# Patient Record
Sex: MALE | Race: WHITE | Employment: FULL TIME | URBAN - METROPOLITAN AREA
[De-identification: names, ages, dates, MRNs, and addresses within clinical notes are randomized per-mention and may not be internally consistent; named-entity substitution may affect disease eponyms.]

---

## 2017-04-10 NOTE — PROGRESS NOTES
"  SUBJECTIVE:                                                    Dionisio Pisano is a 31 year old male who presents to clinic today for the following health issues:      HPI    Concern - Tennis Elbow Left arm      Onset: a few months    Description:   Pain in left elbow and arm    Intensity: mild, moderate    Progression of Symptoms:  intermittent and waxing and waning    Accompanying Signs & Symptoms:  -sharp pain   -sometimes pain is with lifting or just turning   -pain started mild and now is all the time with times of sharper worse pain  -has been using tennis elbow brace from other arm but isn't getting relief        Previous history of similar problem:   Only in Right arm     Precipitating factors:   Worsened by: lifting     Alleviating factors:  Improved by: ice helps temporarily        Therapies Tried and outcome: ice and brace       Problem list and histories reviewed & adjusted, as indicated.  Additional history: as documented    Lump on left side of neck       Duration: 2 years or so     Description (location/character/radiation): left side of neck     Intensity:  moderate    Accompanying signs and symptoms: same size, was looked at once before and was told it may go away on its own. It hasn't, would like to talk about removing it     History (similar episodes/previous evaluation): None    Precipitating or alleviating factors: None    Therapies tried and outcome: None     ROS:  Constitutional, HEENT, cardiovascular, pulmonary, GI, , musculoskeletal, neuro, skin, endocrine and psych systems are negative, except as otherwise noted.    OBJECTIVE:                                                    /58 (BP Location: Left arm, Patient Position: Chair, Cuff Size: Adult Regular)  Pulse 82  Temp 98.7  F (37.1  C) (Oral)  Resp 18  Ht 5' 6.38\" (1.686 m)  Wt 163 lb (73.9 kg)  BMI 26.01 kg/m2  Body mass index is 26.01 kg/(m^2).  GENERAL: healthy, alert and no distress  EYES: Eyes grossly normal to " inspection, PERRL and conjunctivae and sclerae normal  HENT: ear canals and TM's normal, nose and mouth without ulcers or lesions  NECK: no adenopathy, no asymmetry, masses, or scars and thyroid normal to palpation  RESP: lungs clear to auscultation - no rales, rhonchi or wheezes  CV: regular rate and rhythm, normal S1 S2, no S3 or S4, no murmur, click or rub, no peripheral edema and peripheral pulses strong  ABDOMEN: soft, nontender, no hepatosplenomegaly, no masses and bowel sounds normal  MS: LUE exam shows no deformities and pain radiating form elbow to wrist with motion. Denies numbness or tingling. Neg. Finkelstein.    SKIN: dermoid cyst 1 mm in diameter. Non painful, mobile,   NEURO: Normal strength and tone, weakness of LUE and grasp, sensory exam grossly normal and mentation intact     Diagnostic Test Results:  none      ASSESSMENT/PLAN:                                                      1. Lateral epicondylitis of left elbow  Will start oral steroid for acute inflammation and continue home care as instructed.   - methylPREDNISolone (MEDROL DOSEPAK) 4 MG tablet; Follow package instructions  Dispense: 21 tablet; Refill: 0  - ORTHO  REFERRAL    2. Dermoid cyst    Will set up future appointment for procedure  Discussed risks and benefits of procedure including bleeding, infection, and scarring   - Recommend procedure as follows:           1) removal via incision with 15 blade and drainage with sutures and removal in 10 days                        Supplies: 1% lidocaine with epi, 2 alcohol wipes, chloraprep, biopsy blade - 15, lac pack, Sutures: 3-0 Ethilon, sterile 4x4 pack, 2 - sterile 2x2, 2 - small tegaderm    Patient Instructions   Please continue to ice over weekend 3-4 times daily 20 minutes. Arm band as you have been and start steroid today this may cause insomnia so taking earlier would be best.     Please follow up with orthopedic as discussed.     Thank you  Madeline Craft CNP

## 2017-04-14 ENCOUNTER — OFFICE VISIT (OUTPATIENT)
Dept: FAMILY MEDICINE | Facility: OTHER | Age: 32
End: 2017-04-14
Payer: COMMERCIAL

## 2017-04-14 VITALS
HEART RATE: 82 BPM | HEIGHT: 66 IN | RESPIRATION RATE: 18 BRPM | WEIGHT: 163 LBS | DIASTOLIC BLOOD PRESSURE: 58 MMHG | SYSTOLIC BLOOD PRESSURE: 122 MMHG | TEMPERATURE: 98.7 F | BODY MASS INDEX: 26.2 KG/M2

## 2017-04-14 DIAGNOSIS — M77.12 LATERAL EPICONDYLITIS OF LEFT ELBOW: Primary | ICD-10-CM

## 2017-04-14 DIAGNOSIS — D36.9 DERMOID CYST: ICD-10-CM

## 2017-04-14 PROCEDURE — 99214 OFFICE O/P EST MOD 30 MIN: CPT | Performed by: NURSE PRACTITIONER

## 2017-04-14 RX ORDER — METHYLPREDNISOLONE 4 MG
TABLET, DOSE PACK ORAL
Qty: 21 TABLET | Refills: 0 | Status: SHIPPED | OUTPATIENT
Start: 2017-04-14 | End: 2017-05-01

## 2017-04-14 ASSESSMENT — PAIN SCALES - GENERAL: PAINLEVEL: EXTREME PAIN (8)

## 2017-04-14 NOTE — MR AVS SNAPSHOT
After Visit Summary   4/14/2017    Dionisio Pisano    MRN: [id number]           Patient Information     Date Of Birth          1985        Visit Information        Provider Department      4/14/2017 3:20 PM Madeline Craft APRN CNP [facility]        Today's Diagnoses     Lateral epicondylitis of left elbow    -  1      Care Instructions    Please continue to ice over weekend 3-4 times daily 20 minutes. Arm band as you have been and start steroid today this may cause insomnia so taking earlier would be best.     Please follow up with orthopedic as discussed.     Thank you  Madeline Craft CNP          Follow-ups after your visit        Additional Services     ORTHO  REFERRAL       [facility] is referring you to the Orthopedic  Services at [facility].       The  Representative will assist you in the coordination of your Orthopedic and Musculoskeletal Care as prescribed by your physician.    The  Representative will call you within 1 business day to help schedule your appointment, or you may contact the  Representative at:    All areas ~ [phone]     Type of Referral : Non Surgical       Timeframe requested: 3 - 5 days    Coverage of these services is subject to the terms and limitations of your health insurance plan.  Please call member services at your health plan with any benefit or coverage questions.      If X-rays, CT or MRI's have been performed, please contact the facility where they were done to arrange for , prior to your scheduled appointment.  Please bring this referral request to your appointment and present it to your specialist.                  Who to contact     If you have questions or need follow up information about today's clinic visit or your schedule please contact [facility] directly at [phone].  Normal or non-critical lab and imaging  "results will be communicated to you by MyChart, letter or phone within 4 business days after the clinic has received the results. If you do not hear from us within 7 days, please contact the clinic through [employer] or phone. If you have a critical or abnormal lab result, we will notify you by phone as soon as possible.  Submit refill requests through [employer] or call your pharmacy and they will forward the refill request to us. Please allow 3 business days for your refill to be completed.          Additional Information About Your Visit        [employer]limp[employer] Information     [employer] lets you send messages to your doctor, view your test results, renew your prescriptions, schedule appointments and more. To sign up, go to [email or web address] . Click on \"Log in\" on the left side of the screen, which will take you to the Welcome page. Then click on \"Sign up Now\" on the right side of the page.     You will be asked to enter the access code listed below, as well as some personal information. Please follow the directions to create your username and password.     Your access code is: [email or web address]  Expires: 2017  3:44 PM     Your access code will  in 90 days. If you need help or a new code, please call your [place] clinic or [phone].        Care EveryWhere ID     This is your Care EveryWhere ID. This could be used by other organizations to access your [place] medical records  [id number]        Your Vitals Were     Pulse Temperature Respirations Height BMI (Body Mass Index)       82 98.7  F (37.1  C) (Oral) 18 5' 6.38\" (1.686 m) 26.01 kg/m2        Blood Pressure from Last 3 Encounters:   17 122/58   16 128/72   16 120/58    Weight from Last 3 Encounters:   17 163 lb (73.9 kg)   16 157 lb 6.4 oz (71.4 kg)   16 158 lb 12.8 oz (72 kg)              We Performed the Following     ORTHO  REFERRAL          Today's Medication Changes          These changes are accurate as of: " 4/14/17  3:44 PM.  If you have any questions, ask your nurse or doctor.               Start taking these medicines.        Dose/Directions    methylPREDNISolone 4 MG tablet   Commonly known as:  MEDROL DOSEPAK   Used for:  Lateral epicondylitis of left elbow   Started by:  Madeline Craft APRN CNP        Follow package instructions   Quantity:  21 tablet   Refills:  0            Where to get your medicines      These medications were sent to [facility] [place], [place] - [address]  [address] [address]     Phone:  [phone]     methylPREDNISolone 4 MG tablet                Primary Care Provider Office Phone # Fax #    JULIETA Kurtz [phone]       [facility] [address]  [facility] [address]        Thank you!     Thank you for choosing [facility]  for your care. Our goal is always to provide you with excellent care. Hearing back from our patients is one way we can continue to improve our services. Please take a few minutes to complete the written survey that you may receive in the mail after your visit with us. Thank you!             Your Updated Medication List - Protect others around you: Learn how to safely use, store and throw away your medicines at www.disposemymeds.org.          This list is accurate as of: 4/14/17  3:44 PM.  Always use your most recent med list.                   Brand Name Dispense Instructions for use    methylPREDNISolone 4 MG tablet    MEDROL DOSEPAK    21 tablet    Follow package instructions

## 2017-04-14 NOTE — NURSING NOTE
"Chief Complaint   Patient presents with     Elbow Pain     Derm Problem     cyst on neck     Panel Management     quit plan, mychart       Initial /58 (BP Location: Left arm, Patient Position: Chair, Cuff Size: Adult Regular)  Pulse 82  Temp 98.7  F (37.1  C) (Oral)  Resp 18  Ht 5' 6.38\" (1.686 m)  Wt 163 lb (73.9 kg)  BMI 26.01 kg/m2 Estimated body mass index is 26.01 kg/(m^2) as calculated from the following:    Height as of this encounter: 5' 6.38\" (1.686 m).    Weight as of this encounter: 163 lb (73.9 kg).  Medication Reconciliation: complete    "

## 2017-04-14 NOTE — PATIENT INSTRUCTIONS
Please continue to ice over weekend 3-4 times daily 20 minutes. Arm band as you have been and start steroid today this may cause insomnia so taking earlier would be best.     Please follow up with orthopedic as discussed.     Thank you  Madeline Craft CNP

## 2017-04-17 ENCOUNTER — TELEPHONE (OUTPATIENT)
Dept: FAMILY MEDICINE | Facility: OTHER | Age: 32
End: 2017-04-17

## 2017-04-17 ENCOUNTER — OFFICE VISIT (OUTPATIENT)
Dept: ORTHOPEDICS | Facility: OTHER | Age: 32
End: 2017-04-17
Payer: COMMERCIAL

## 2017-04-17 VITALS — BODY MASS INDEX: 26.2 KG/M2 | WEIGHT: 163 LBS | HEART RATE: 78 BPM | HEIGHT: 66 IN

## 2017-04-17 DIAGNOSIS — M77.12 LATERAL EPICONDYLITIS OF LEFT ELBOW: Primary | ICD-10-CM

## 2017-04-17 PROCEDURE — 99204 OFFICE O/P NEW MOD 45 MIN: CPT | Mod: 25 | Performed by: PHYSICAL MEDICINE & REHABILITATION

## 2017-04-17 PROCEDURE — 20551 NJX 1 TENDON ORIGIN/INSJ: CPT | Mod: LT | Performed by: PHYSICAL MEDICINE & REHABILITATION

## 2017-04-17 RX ORDER — TRIAMCINOLONE ACETONIDE 40 MG/ML
40 INJECTION, SUSPENSION INTRA-ARTICULAR; INTRAMUSCULAR ONCE
Qty: 1 ML | Refills: 0 | OUTPATIENT
Start: 2017-04-17 | End: 2017-04-17

## 2017-04-17 NOTE — MR AVS SNAPSHOT
After Visit Summary   4/17/2017    Dionisio Pisano    MRN: [id number]           Patient Information     Date Of Birth          1985        Visit Information        Provider Department      4/17/2017 5:20 PM Amna De Jesus MD [facility]        Today's Diagnoses     Lateral epicondylitis of left elbow    -  1      Care Instructions    Today's Plan of Care:  -Steroid injection performed today.  Take it easy over the next few days. Keep in mind that the steroid may take up to 3 days to start working and up to 2 weeks to reach maximal effect.  Ice 15-20 minutes as needed for soreness.  Ibuprofen and/or Tylenol as needed for pain relief.  -Ice 15-20 minutes for pain relief as needed  -Ibuprofen (Advil) maximum of 800mg every 4-6 hours with food as needed for pain.  -Continue arm band as needed    Follow Up: 1 month or sooner if symptoms fail to improve or worsen. Call with any questions or concerns.             Follow-ups after your visit        Who to contact     If you have questions or need follow up information about today's clinic visit or your schedule please contact [facility] directly at [phone].  Normal or non-critical lab and imaging results will be communicated to you by [employer], letter or phone within 4 business days after the clinic has received the results. If you do not hear from us within 7 days, please contact the clinic through [employer] or phone. If you have a critical or abnormal lab result, we will notify you by phone as soon as possible.  Submit refill requests through [employer] or call your pharmacy and they will forward the refill request to us. Please allow 3 business days for your refill to be completed.          Additional Information About Your Visit        MyChart Information     [employer]One lets you send messages to your doctor, view your test results, renew your prescriptions, schedule appointments and more. To sign up, go to  "[email or web address] . Click on \"Log in\" on the left side of the screen, which will take you to the Welcome page. Then click on \"Sign up Now\" on the right side of the page.     You will be asked to enter the access code listed below, as well as some personal information. Please follow the directions to create your username and password.     Your access code is: [email or web address]  Expires: 2017  3:44 PM     Your access code will  in 90 days. If you need help or a new code, please call your [place] clinic or [phone].        Care EveryWhere ID     This is your Care EveryWhere ID. This could be used by other organizations to access your [place] medical records  [id number]        Your Vitals Were     Pulse Height BMI (Body Mass Index)             78 5' 6.38\" (1.686 m) 26.01 kg/m2          Blood Pressure from Last 3 Encounters:   17 122/58   16 128/72   16 120/58    Weight from Last 3 Encounters:   17 163 lb (73.9 kg)   17 163 lb (73.9 kg)   16 157 lb 6.4 oz (71.4 kg)              Today, you had the following     No orders found for display       Primary Care Provider Office Phone # Fax #    Madeline Mckeon JULIETA Ventura [phone]       [address]  [facility] [address]        Thank you!     Thank you for choosing [facility]  for your care. Our goal is always to provide you with excellent care. Hearing back from our patients is one way we can continue to improve our services. Please take a few minutes to complete the written survey that you may receive in the mail after your visit with us. Thank you!             Your Updated Medication List - Protect others around you: Learn how to safely use, store and throw away your medicines at www.disposemymeds.org.          This list is accurate as of: 17  5:38 PM.  Always use your most recent med list.                   Brand Name Dispense Instructions " for use    methylPREDNISolone 4 MG tablet    MEDROL DOSEPAK    21 tablet    Follow package instructions

## 2017-04-17 NOTE — PROGRESS NOTES
Sports Medicine Clinic Visit    PCP: Madeline Craft    CC: Patient presents with:  Elbow Pain: left      HPI:  Dionisio Pisano is a 31 year old male who is seen in consultation at the request of Madeline Craft CNP.   He notes pain that began 1 month ago with insidious onset.  Pain is located on the left lateral elbow.  He rates the pain at a 8/10 at its worst and a 8/10 currently. Symptoms are relieved with Medrol dose pack, ice, and elbow strap and worsened by general use of the hand and arm. He endorses numbness and tingling through the 3rd and 4th finger occasionally and weakness and denies swelling, bruising, popping, grinding, catching, locking, instability, pain in other joints and fever, chills.  Other treatment has included nothing.     Review of Systems:  Musculoskeletal: as above  Remainder of review of systems is negative including constitutional, eyes, ENT, CV, pulmonary, GI, , endocrine, skin, hematologic, and neurologic except as noted in HPI or medical history.    History reviewed. No pertinent past surgical/medical/family/social history.      Social History     Social History     Marital status: Single     Spouse name: N/A     Number of children: N/A     Years of education: N/A     Occupational History     Not on file.     Social History Main Topics     Smoking status: Current Every Day Smoker     Packs/day: 0.50     Types: Dip, chew, snus or snuff     Last attempt to quit: 4/4/2009     Smokeless tobacco: Current User     Types: Chew     Alcohol use Yes      Comment: few beers every other night     Drug use: No     Sexual activity: Yes     Partners: Female     Birth control/ protection: Condom     Other Topics Concern     Parent/Sibling W/ Cabg, Mi Or Angioplasty Before 65f 55m? 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  At baseline, he does not need assistance with ambulation      Current Outpatient Prescriptions   Medication     methylPREDNISolone (MEDROL DOSEPAK)  "4 MG tablet     No current facility-administered medications for this visit.      Allergies   Allergen Reactions     No Known Drug Allergies          Objective:  Pulse 78  Ht 5' 6.38\" (1.686 m)  Wt 163 lb (73.9 kg)  BMI 26.01 kg/m2    General: healthy, alert and in no distress    Head: Normocephalic, atraumatic  Eyes: no scleral icterus or conjunctival erythema   Oropharynx:  Mucous membranes moist  Skin: no erythema, ecchymosis, petechiae, or jaundice  CV: regular rhythm by palpation, 2+ distal pulses, no pedal edema    Resp: normal respiratory effort without conversational dyspnea   Psych: normal mood and affect    Gait: Non-antalgic, appropriate coordination and balance   Neuro: normal light touch sensory exam of the extremities. Motor strength as noted below    Musculoskeletal:    Bilateral Elbow exam:    Inspection:     no ecchymosis       no edema or effusion    Palpation:  Tender to palpation over the left lateral epicondyle    ROM:      full with flexion, extension, forearm supination and pronation bilaterally    Strength:     flexion 5/5 bilateral       extension 5/5 bilateral       forearm supination 5/5 bilateral       forearm pronation 5/5 bilateral       wrist flexion 5/5 bilaterally       wrist extension 5/5 bilaterally   *pain with resisted wrist extension on the left        strength 5/5 bilaterally       finger abduction 5/5 bilaterally       finger extension 5/5 bilaterally    Sensation: intact to light touch in the bilateral upper extremities    Radiology:  No imaging today    Procedure:  Discussed steroid injection, including risks, potential benefits, and alternatives.  The patient expressed understanding.  Obtained verbal and written consent and the patient elected to proceed.  Procedure: A steroid injection was performed under aseptic technique at the left lateral epicondyle using 1 mL of 1% plain Lidocaine and 1 mL of 40 mg/mL Kenalog.  Bandage applied. This was well " tolerated.    Assessment:  1. Lateral epicondylitis of left elbow        Plan:  Discussed the assessment with the patient. We discussed the following treatment options: symptom treatment, activity modification/rest, imaging and rehab. Following discussion, plan:  -Steroid injection performed today.  Take it easy over the next few days. Keep in mind that the steroid may take up to 3 days to start working and up to 2 weeks to reach maximal effect.  Ice 15-20 minutes as needed for soreness.  Ibuprofen and/or Tylenol as needed for pain relief.  -Ice 15-20 minutes for pain relief as needed  -Ibuprofen (Advil) maximum of 800mg every 4-6 hours with food as needed for pain.  -Continue forearm strap as desired  -Follow Up: 1 month or sooner if symptoms fail to improve or worsen. Call with any questions or concerns.     Merissa De Jesus MD, CAQ  [facility]

## 2017-04-17 NOTE — PATIENT INSTRUCTIONS
Today's Plan of Care:  -Steroid injection performed today.  Take it easy over the next few days. Keep in mind that the steroid may take up to 3 days to start working and up to 2 weeks to reach maximal effect.  Ice 15-20 minutes as needed for soreness.  Ibuprofen and/or Tylenol as needed for pain relief.  -Ice 15-20 minutes for pain relief as needed  -Ibuprofen (Advil) maximum of 800mg every 4-6 hours with food as needed for pain.  -Continue arm band as needed    Follow Up: 1 month or sooner if symptoms fail to improve or worsen. Call with any questions or concerns.

## 2017-04-17 NOTE — TELEPHONE ENCOUNTER
Patient stopped into clinic for an appointment with PCP. Patient mentioned that he was to be getting a call from the orthopedic team to set up a consults for cyst on Left side of neck. Patient had questioned why he would be going to an orthopedic for this when he could come to clinic. If patient is able to come to clinic he also is needing to know if he would need a consult since he already discussed with Madeline about the cyst. Please advise     Otilia Colby  Reception/ Scheduling

## 2017-04-17 NOTE — NURSING NOTE
"Chief Complaint   Patient presents with     Elbow Pain     left       Initial Pulse 78  Ht 5' 6.38\" (1.686 m)  Wt 163 lb (73.9 kg)  BMI 26.01 kg/m2 Estimated body mass index is 26.01 kg/(m^2) as calculated from the following:    Height as of this encounter: 5' 6.38\" (1.686 m).    Weight as of this encounter: 163 lb (73.9 kg).  Medication Reconciliation: complete     Dominga Singh M.Ed., ATC      "

## 2017-04-17 NOTE — TELEPHONE ENCOUNTER
"Patient saw Sports Med today for tennis elbow. He wasn't referred to anyone for the cyst on the neck. WS told patient to follow up with her to have it removed. Scheduled for 05/01.     Plan from OV 04/14/17 - \"2. Dermoid cyst     Will set up future appointment for procedure  Discussed risks and benefits of procedure including bleeding, infection, and scarring   - Recommend procedure as follows:   1) removal via incision with 15 blade and drainage with sutures and removal in 10 days   Supplies: 1% lidocaine with epi, 2 alcohol wipes, chloraprep, biopsy blade - 15, lac pack, Sutures: 3-0 Ethilon, sterile 4x4 pack, 2 - sterile 2x2, 2 - small tegaderm\"    Saundra Rogers CMA    "

## 2017-04-21 NOTE — PROGRESS NOTES
"  SUBJECTIVE:                                                    Dionisio Pisano is a 31 year old male who presents to clinic today for the following health issues:      HPI     Pt is here for removal of cyst from neck as discussed on visit 4/14/17    Problem list and histories reviewed & adjusted, as indicated.  Additional history: as documented    ROS:  Constitutional, HEENT, cardiovascular, pulmonary, gi and gu systems are negative, except as otherwise noted.    OBJECTIVE:                                                    /79 (BP Location: Left arm, Patient Position: Chair, Cuff Size: Adult Large)  Pulse 67  Temp 98.6  F (37  C) (Oral)  Resp 18  Ht 5' 6.38\" (1.686 m)  Wt 165 lb (74.8 kg)  SpO2 98%  BMI 26.33 kg/m2  Body mass index is 26.33 kg/(m^2).  GENERAL: healthy, alert and no distress  NECK: no adenopathy, no asymmetry, masses, or scars and thyroid normal to palpation  RESP: lungs clear to auscultation - no rales, rhonchi or wheezes  CV: regular rate and rhythm, normal S1 S2, no S3 or S4, no murmur, click or rub, no peripheral edema and peripheral pulses strong  ABDOMEN: soft, nontender, no hepatosplenomegaly, no masses and bowel sounds normal  MS: no gross musculoskeletal defects noted, no edema  SKIN: dermoid cyst left neck approximately 1.8 mm in diameter, painless, and mobile     Discussed risks and benefits of procedure including bleeding, infection, and scarring   Pause for the cause has been completed prior to the prceedure.   1. Patient was identified by both name and date of birth   2. The correct site was identified   3. Site was marked by provider    4. Written informed consent correct and signed or verbal authorization  to proceed was obtained   5. Verifed necessary supplies, equipment, and diagnostics are available    6. Time out was performed immediately prior to procedure    Objective: The lesion(s) is/are of the above mentioned size and location and is/are firm, mobile, nodule. " The area was prepped using alcohol swabs and appropriately anesthetized using 0.5 cc of 1% lidocaine with epi. Using the usual technique a 15 blade scapule, cyst incision and removal was performed. Hemostasis was obtained using pressure, 3 sutures placed. An appropriate dressing was applied. The procedure was well tolerated and without complications.  Assisted and observed by Sudarshan Manzo PA-C    Supplies: 1% lidocaine with epi, 2 alcohol wipes, chloraprep, biopsy blade - 15, lac pack, Sutures: 3-0 Ethilon, sterile 4x4 pack, 2 - sterile 2x2, 2 - small tegaderm  Diagnostic Test Results:  none      ASSESSMENT/PLAN:                                                      1. Dermoid cyst    Patient Instructions   Remove dressing in am and wash with soap and water. Recover for work until no longer open and oozing. Return to clinic in 2 weeks for suture removal.   If symptoms of infection such as increased fever, redness, inflammation, or oozing and pain return to clinic for evaluation.     Thank you  Madeline Craft CNP

## 2017-05-01 ENCOUNTER — OFFICE VISIT (OUTPATIENT)
Dept: FAMILY MEDICINE | Facility: OTHER | Age: 32
End: 2017-05-01
Payer: COMMERCIAL

## 2017-05-01 VITALS
OXYGEN SATURATION: 98 % | RESPIRATION RATE: 18 BRPM | HEIGHT: 66 IN | TEMPERATURE: 98.6 F | HEART RATE: 67 BPM | BODY MASS INDEX: 26.52 KG/M2 | WEIGHT: 165 LBS | DIASTOLIC BLOOD PRESSURE: 79 MMHG | SYSTOLIC BLOOD PRESSURE: 135 MMHG

## 2017-05-01 DIAGNOSIS — D36.9 DERMOID CYST: Primary | ICD-10-CM

## 2017-05-01 PROCEDURE — 11422 EXC H-F-NK-SP B9+MARG 1.1-2: CPT | Performed by: NURSE PRACTITIONER

## 2017-05-01 ASSESSMENT — PAIN SCALES - GENERAL: PAINLEVEL: NO PAIN (0)

## 2017-05-01 NOTE — MR AVS SNAPSHOT
After Visit Summary   5/1/2017    Dionisio Pisano    MRN: [id number]           Patient Information     Date Of Birth          1985        Visit Information        Provider Department      5/1/2017 3:10 PM Madeline Craft APRN CNP [facility]        Today's Diagnoses     Tobacco use disorder    -  1      Care Instructions    Remove dressing in am and wash with soap and water. Recover for work until no longer open and oozing. Return to clinic in 2 weeks for suture removal.   If symptoms of infection such as increased fever, redness, inflammation, or oozing and pain return to clinic for evaluation.     Thank you  Madeline Craft CNP          Follow-ups after your visit        Your next 10 appointments already scheduled     May 15, 2017  6:20 PM CDT   Return Visit with Amna De Jesus MD   [facility] ([facility])    [address] [place] [address] [address]  [facility] [id number] [phone]              Who to contact     If you have questions or need follow up information about today's clinic visit or your schedule please contact [facility] directly at [phone].  Normal or non-critical lab and imaging results will be communicated to you by MyChart, letter or phone within 4 business days after the clinic has received the results. If you do not hear from us within 7 days, please contact the clinic through MyChart or phone. If you have a critical or abnormal lab result, we will notify you by phone as soon as possible.  Submit refill requests through [employer] or call your pharmacy and they will forward the refill request to us. Please allow 3 business days for your refill to be completed.          Additional Information About Your Visit        MyChart Information     [employer] lets you send messages to your doctor, view your test results, renew your prescriptions, schedule appointments and more. To sign up, go to  [email or web address] . Click on \"Log in\" on the left side of the screen, which will take you to the Welcome page. Then click on \"Sign up Now\" on the right side of the page.     You will be asked to enter the access code listed below, as well as some personal information. Please follow the directions to create your username and password.     Your access code is: [email or web address]  Expires: 2017  3:44 PM     Your access code will  in 90 days. If you need help or a new code, please call your [place] clinic or [phone].        Care EveryWhere ID     This is your Care EveryWhere ID. This could be used by other organizations to access your [place] medical records  [id number]        Your Vitals Were     Pulse Temperature Respirations Height Pulse Oximetry BMI (Body Mass Index)    67 98.6  F (37  C) (Oral) 18 5' 6.38\" (1.686 m) 98% 26.33 kg/m2       Blood Pressure from Last 3 Encounters:   17 135/79   17 122/58   16 128/72    Weight from Last 3 Encounters:   17 165 lb (74.8 kg)   17 163 lb (73.9 kg)   17 163 lb (73.9 kg)              Today, you had the following     No orders found for display       Primary Care Provider Office Phone # Fax #    Madeline Linda JULIETA Ventura [phone]       [address]  [facility] [address]        Thank you!     Thank you for choosing [facility]  for your care. Our goal is always to provide you with excellent care. Hearing back from our patients is one way we can continue to improve our services. Please take a few minutes to complete the written survey that you may receive in the mail after your visit with us. Thank you!             Your Updated Medication List - Protect others around you: Learn how to safely use, store and throw away your medicines at www.disposemymeds.org.          This list is accurate as of: 17  4:21 PM.  Always use your most recent med list. "                   Brand Name Dispense Instructions for use    IBUPROFEN PO

## 2017-05-01 NOTE — NURSING NOTE
"Chief Complaint   Patient presents with     Procedure     cyst removal     Panel Management     Quit Plan        Initial /79 (BP Location: Left arm, Patient Position: Chair, Cuff Size: Adult Large)  Pulse 67  Temp 98.6  F (37  C) (Oral)  Resp 18  Ht 5' 6.38\" (1.686 m)  Wt 165 lb (74.8 kg)  SpO2 98%  BMI 26.33 kg/m2 Estimated body mass index is 26.33 kg/(m^2) as calculated from the following:    Height as of this encounter: 5' 6.38\" (1.686 m).    Weight as of this encounter: 165 lb (74.8 kg).  Medication Reconciliation: complete    "

## 2017-05-01 NOTE — PATIENT INSTRUCTIONS
Remove dressing in am and wash with soap and water. Recover for work until no longer open and oozing. Return to clinic in 2 weeks for suture removal.   If symptoms of infection such as increased fever, redness, inflammation, or oozing and pain return to clinic for evaluation.     Thank you  Madeline Craft CNP

## 2018-05-09 NOTE — PROGRESS NOTES
SUBJECTIVE:   CC: Dionisio Pisano is an 32 year old male who presents for preventative health visit.     Physical   Annual:     Getting at least 3 servings of Calcium per day::  Yes    Bi-annual eye exam::  NO    Dental care twice a year::  Yes    Sleep apnea or symptoms of sleep apnea::  None    Diet::  Regular (no restrictions)    Frequency of exercise::  1 day/week    Duration of exercise::  30-45 minutes    Taking medications regularly::  Yes    Additional concerns today::  No              Depression or Anxiety - New Diagnosis   Duration of complaint:   Abnormal Mood Symptoms      Duration:    Description:  Depression: YES  Anxiety: YES  Panic attacks: YES- sometimes      Accompanying signs and symptoms: see PHQ-9 and GARY scores    History (similar episodes/previous evaluation): Yes tried lexapro     Precipitating or alleviating factors: Dog     Therapies tried and outcome: Drinking alcohol- worsening symptoms.     No thoughts of suicide or self harm    Dog component of support and motivation     PHQ-9 SCORE 5/14/2018   Total Score MyChart 19 (Moderately severe depression)   Total Score 19       Today's PHQ-2 Score:   PHQ-2 ( 1999 [employer]) 5/14/2018   Q1: Little interest or pleasure in doing things 2   Q2: Feeling down, depressed or hopeless 3   PHQ-2 Score 5   Q1: Little interest or pleasure in doing things More than half the days   Q2: Feeling down, depressed or hopeless Nearly every day   PHQ-2 Score 5       Abuse: Current or Past(Physical, Sexual or Emotional)- No  Do you feel safe in your environment - Yes    Social History   Substance Use Topics     Smoking status: Former Smoker     Packs/day: 0.50     Types: Dip, chew, snus or snuff     Quit date: 4/4/2009     Smokeless tobacco: Current User     Types: Chew     Alcohol use 24.0 oz/week     40 Cans of beer per week      Comment: few beers every other night     Alcohol Use 5/14/2018   If you drink alcohol do you typically have greater than 3 drinks per day  OR greater than 7 drinks per week? Yes   AUDIT SCORE  23     AUDIT - Alcohol Use Disorders Identification Test - Reproduced from the World Health Organization Audit 2001 (Second Edition) 5/14/2018   1.  How often do you have a drink containing alcohol? 2 to 3 times a week   2.  How many drinks containing alcohol do you have on a typical day when you are drinking? 5 or 6   3.  How often do you have five or more drinks on one occasion? Weekly   4.  How often during the last year have you found that you were not able to stop drinking once you had started? Weekly   5.  How often during the last year have you failed to do what was normally expected of you because of drinking? Less than monthly   6.  How often during the last year have you needed a first drink in the morning to get yourself going after a heavy drinking session? Never   7.  How often during the last year have you had a feeling of guilt or remorse after drinking? Weekly   8.  How often during the last year have you been unable to remember what happened the night before because of your drinking? Monthly   9.  Have you or someone else been injured because of your drinking? Yes, but not in the last year   10. Has a relative, friend, doctor or other health care worker been concerned about your drinking or suggested you cut down? Yes, during the last year   TOTAL SCORE 23       Last PSA: No results found for: PSA    Reviewed orders with patient. Reviewed health maintenance and updated orders accordingly - Yes  BP Readings from Last 3 Encounters:   05/14/18 134/74   05/01/17 135/79   04/14/17 122/58    Wt Readings from Last 3 Encounters:   05/14/18 167 lb (75.8 kg)   05/01/17 165 lb (74.8 kg)   04/17/17 163 lb (73.9 kg)                  Allergies   Allergen Reactions     No Known Drug Allergies        Reviewed and updated as needed this visit by clinical staff  Tobacco  Allergies  Meds  Med Hx  Surg Hx  Fam Hx  Soc Hx        Reviewed and updated as needed  "this visit by Provider        History reviewed. No pertinent past medical history.   History reviewed. No pertinent surgical history.    Review of Systems   Constitutional: Negative for chills.   HENT: Negative for congestion, ear pain and hearing loss.    Eyes: Negative for pain and visual disturbance.   Respiratory: Negative for cough and shortness of breath.    Cardiovascular: Negative for chest pain, palpitations and peripheral edema.   Gastrointestinal: Negative for abdominal pain, constipation, diarrhea, heartburn, hematochezia and nausea.   Genitourinary: Negative for discharge, dysuria, frequency, genital sores, hematuria, impotence and urgency.   Musculoskeletal: Positive for arthralgias and myalgias. Negative for joint swelling.   Skin: Negative for rash.   Neurological: Negative for dizziness, weakness, headaches and paresthesias.   Psychiatric/Behavioral: Positive for mood changes.       OBJECTIVE:   /74  Pulse 84  Temp 99.3  F (37.4  C)  Resp 16  Ht 5' 7\" (1.702 m)  Wt 167 lb (75.8 kg)  BMI 26.16 kg/m2   BP Readings from Last 3 Encounters:   05/14/18 134/74   05/01/17 135/79   04/14/17 122/58       Physical Exam   Constitutional: He is oriented to person, place, and time. He appears well-developed and well-nourished. No distress.   HENT:   Right Ear: Tympanic membrane and external ear normal.   Left Ear: Tympanic membrane and external ear normal.   Nose: Nose normal.   Mouth/Throat: Oropharynx is clear and moist. No oral lesions. No oropharyngeal exudate.   Eyes: Conjunctivae are normal. Pupils are equal, round, and reactive to light. Right eye exhibits no discharge. Left eye exhibits no discharge.   Neck: Neck supple. No tracheal deviation present. No thyromegaly present.   Cardiovascular: Normal rate, regular rhythm, S1 normal, S2 normal, normal heart sounds and normal pulses.  Exam reveals no S3 and no S4.    No murmur heard.  Pulmonary/Chest: Effort normal and breath sounds normal. No " respiratory distress. He has no wheezes. He has no rales.   Abdominal: Soft. Bowel sounds are normal. He exhibits no mass. There is no hepatosplenomegaly. There is no tenderness.   Musculoskeletal: Normal range of motion. He exhibits no edema or deformity.   Lymphadenopathy:     He has no cervical adenopathy.   Neurological: He is alert and oriented to person, place, and time. He has normal strength and normal reflexes. He exhibits normal muscle tone.   Skin: Skin is warm and dry. No lesion and no rash noted.   Psychiatric: His speech is normal. Judgment and thought content normal. His mood appears anxious. He is withdrawn. Cognition and memory are normal.       ASSESSMENT/PLAN:   1. Encounter for routine adult health examination without abnormal findings  - Updated HM     2. Alcohol abuse    - Lipid Profile (Chol, Trig, HDL, LDL calc)  - Comprehensive metabolic panel    3. Alcohol dependence with alcohol-induced mood disorder (H)    - Lipid Profile (Chol, Trig, HDL, LDL calc)  - Comprehensive metabolic panel    4. Tobacco use disorder  - Declined     5. Moderate episode of recurrent major depressive disorder (H)  Will start on sertraline 25 mg daily 1 week  then increase to 50mg daily. Side effects discussed and provided in patient instructions. Also discussed the pathophysiology of this medication and its effect on serotonin. Patient was alerted that this medication can take at least one month to go into full effect and that he should not be discouraged if he does not see his symptoms improving right away. I spent a good deal of time discussing the depression action plan including adherence to his medication, regular exercise and healthy diet, support of friends and family, and undergoing individual psychotherapy  - Follow up in 3-4 weeks with OV or ok for telephone visit.   - sertraline (ZOLOFT) 50 MG tablet; Take 1/2 tablet (25 mg) for 1 week, then increase to 1 tablet orally daily  Dispense: 30 tablet; Refill:  "1  - hydrOXYzine (ATARAX) 25 MG tablet; Take 1-2 tablets (25-50 mg) by mouth every 6 hours as needed for anxiety  Dispense: 60 tablet; Refill: 1    6. GARY (generalized anxiety disorder)  - As noted above  - Could consider Buspar in the future due to chewing tobacco use.   - sertraline (ZOLOFT) 50 MG tablet; Take 1/2 tablet (25 mg) for 1 week, then increase to 1 tablet orally daily  Dispense: 30 tablet; Refill: 1  - hydrOXYzine (ATARAX) 25 MG tablet; Take 1-2 tablets (25-50 mg) by mouth every 6 hours as needed for anxiety  Dispense: 60 tablet; Refill: 1    COUNSELING:   Reviewed preventive health counseling, as reflected in patient instructions       Regular exercise       Healthy diet/nutrition    BP Screening:   Last 3 BP Readings:    BP Readings from Last 3 Encounters:   05/14/18 134/74   05/01/17 135/79   04/14/17 122/58       The following was recommended to the patient:  Re-screen BP within a year and recommended lifestyle modifications     reports that he quit smoking about 9 years ago. His smoking use included Dip, chew, snus or snuff. He smoked 0.50 packs per day. His smokeless tobacco use includes Chew.  Tobacco Cessation Action Plan: Information offered: Patient not interested at this time  Phone counseling: Place order for QuitPlan (Tobacco Cessation [facility] Referral 5051)  Estimated body mass index is 26.16 kg/(m^2) as calculated from the following:    Height as of this encounter: 5' 7\" (1.702 m).    Weight as of this encounter: 167 lb (75.8 kg).   Weight management plan: Discussed healthy diet and exercise guidelines and patient will follow up in 6 months in clinic to re-evaluate.    Counseling Resources:  ATP IV Guidelines  Pooled Cohorts Equation Calculator  FRAX Risk Assessment  ICSI Preventive Guidelines  Dietary Guidelines for Americans, 2010  USDA's MyPlate  ASA Prophylaxis  Lung CA Screening    JULIETA Cook [facility]"

## 2018-05-09 NOTE — PATIENT INSTRUCTIONS
- Will do labs today to check your kidney and liver functions, continue to work on reducing your alcohol intake.   - Will start on sertraline 25 mg daily for 1 week then increase to 50mg daily.   -Side effects discussed and provided in instructions below  - Please note that this medication can take at least one month to go into full effect, please do not be discouraged if you do not see your symptoms improving right away.  - It is important to also utilize other non pharmacological mechanisms to manage your anxiety, this includes regular exercise and healthy diet, support of friends and family, and undergoing individual psychotherapy  - Do not stop medication abruptly, please notify me if you have concerns before stopping as we will need to taper you off this medication  - Adherence is important with this medication.   - Atarax as needed for anxiety/insomnia   - Follow up in 3-4 weeks.     JULIETA Cook CNP                            Preventive Health Recommendations  Male Ages 26 - 39    Yearly exam:             See your health care provider every year in order to  o   Review health changes.   o   Discuss preventive care.    o   Review your medicines if your doctor has prescribed any.    You should be tested each year for STDs (sexually transmitted diseases), if you re at risk.     After age 35, talk to your provider about cholesterol testing. If you are at risk for heart disease, have your cholesterol tested at least every 5 years.     If you are at risk for diabetes, you should have a diabetes test (fasting glucose).  Shots: Get a flu shot each year. Get a tetanus shot every 10 years.     Nutrition:    Eat at least 5 servings of fruits and vegetables daily.     Eat whole-grain bread, whole-wheat pasta and brown rice instead of white grains and rice.     Talk to your provider about Calcium and Vitamin D.     Lifestyle    Exercise for at least 150 minutes a week (30 minutes a day, 5 days a week). This will  help you control your weight and prevent disease.     Limit alcohol to one drink per day.     No smoking.     Wear sunscreen to prevent skin cancer.     See your dentist every six months for an exam and cleaning.     Sertraline tablets  Brand Name: Zoloft  What is this medicine?  SERTRALINE (SER tra gunnar) is used to treat depression. It may also be used to treat obsessive compulsive disorder, panic disorder, post-trauma stress, premenstrual dysphoric disorder (PMDD) or social anxiety.  How should I use this medicine?  Take this medicine by mouth with a glass of water. Follow the directions on the prescription label. You can take it with or without food. Take your medicine at regular intervals. Do not take your medicine more often than directed. Do not stop taking this medicine suddenly except upon the advice of your doctor. Stopping this medicine too quickly may cause serious side effects or your condition may worsen.  A special MedGuide will be given to you by the pharmacist with each prescription and refill. Be sure to read this information carefully each time.  Talk to your pediatrician regarding the use of this medicine in children. While this drug may be prescribed for children as young as 7 years for selected conditions, precautions do apply.  What side effects may I notice from receiving this medicine?  Side effects that you should report to your doctor or health care professional as soon as possible:    allergic reactions like skin rash, itching or hives, swelling of the face, lips, or tongue    anxious    black, tarry stools    changes in vision    confusion    elevated mood, decreased need for sleep, racing thoughts, impulsive behavior    eye pain    fast, irregular heartbeat    feeling faint or lightheaded, falls    feeling agitated, angry, or irritable    hallucination, loss of contact with reality    loss of balance or coordination    loss of memory    painful or prolonged erections    restlessness,  pacing, inability to keep still    seizures    stiff muscles    suicidal thoughts or other mood changes    trouble sleeping    unusual bleeding or bruising    unusually weak or tired    vomiting  Side effects that usually do not require medical attention (report to your doctor or health care professional if they continue or are bothersome):    change in appetite or weight    change in sex drive or performance    diarrhea    increased sweating    indigestion, nausea    tremors  What may interact with this medicine?  Do not take this medicine with any of the following medications:    certain medicines for fungal infections like fluconazole, itraconazole, ketoconazole, posaconazole, voriconazole    cisapride    disulfiram    dofetilide    linezolid    MAOIs like Carbex, Eldepryl, Marplan, Nardil, and Parnate    metronidazole    methylene blue (injected into a vein)    pimozide    thioridazine    ziprasidone  This medicine may also interact with the following medications:    alcohol    amphetamines    aspirin and aspirin-like medicines    certain medicines for depression, anxiety, or psychotic disturbances    certain medicines for irregular heart beat like flecainide, propafenone    certain medicines for migraine headaches like almotriptan, eletriptan, frovatriptan, naratriptan, rizatriptan, sumatriptan, zolmitriptan    certain medicines for sleep    certain medicines for seizures like carbamazepine, valproic acid, phenytoin    certain medicines that treat or prevent blood clots like warfarin, enoxaparin, dalteparin    cimetidine    digoxin    diuretics    fentanyl    furazolidone    isoniazid    lithium    NSAIDs, medicines for pain and inflammation, like ibuprofen or naproxen    other medicines that prolong the QT interval (cause an abnormal heart rhythm)    procarbazine    rasagiline    supplements like [place]'s wort, kava kava, valerian    tolbutamide    tramadol    tryptophan  What if I miss a dose?  If you miss  a dose, take it as soon as you can. If it is almost time for your next dose, take only that dose. Do not take double or extra doses.  Where should I keep my medicine?  Keep out of the reach of children.  Store at room temperature between 15 and 30 degrees C (59 and 86 degrees F). Throw away any unused medicine after the expiration date.  What should I tell my health care provider before I take this medicine?  They need to know if you have any of these conditions:    bleeding disorders    bipolar disorder or a family history of bipolar disorder    glaucoma    heart disease    high blood pressure    history of irregular heartbeat    history of low levels of calcium, magnesium, or potassium in the blood    if you often drink alcohol    liver disease    receiving electroconvulsive therapy    seizures    suicidal thoughts, plans, or attempt; a previous suicide attempt by you or a family member    take medicines that treat or prevent blood clots    thyroid disease    an unusual or allergic reaction to sertraline, other medicines, foods, dyes, or preservatives    pregnant or trying to get pregnant    breast-feeding  What should I watch for while using this medicine?  Tell your doctor if your symptoms do not get better or if they get worse. Visit your doctor or health care professional for regular checks on your progress. Because it may take several weeks to see the full effects of this medicine, it is important to continue your treatment as prescribed by your doctor.  Patients and their families should watch out for new or worsening thoughts of suicide or depression. Also watch out for sudden changes in feelings such as feeling anxious, agitated, panicky, irritable, hostile, aggressive, impulsive, severely restless, overly excited and hyperactive, or not being able to sleep. If this happens, especially at the beginning of treatment or after a change in dose, call your health care professional.  You may get drowsy or dizzy.  Do not drive, use machinery, or do anything that needs mental alertness until you know how this medicine affects you. Do not stand or sit up quickly, especially if you are an older patient. This reduces the risk of dizzy or fainting spells. Alcohol may interfere with the effect of this medicine. Avoid alcoholic drinks.  Your mouth may get dry. Chewing sugarless gum or sucking hard candy, and drinking plenty of water may help. Contact your doctor if the problem does not go away or is severe.  NOTE:This sheet is a summary. It may not cover all possible information. If you have questions about this medicine, talk to your doctor, pharmacist, or health care provider. Copyright  2018 [employer]

## 2018-05-14 ENCOUNTER — OFFICE VISIT (OUTPATIENT)
Dept: FAMILY MEDICINE | Facility: OTHER | Age: 33
End: 2018-05-14
Payer: COMMERCIAL

## 2018-05-14 VITALS
HEIGHT: 67 IN | HEART RATE: 84 BPM | DIASTOLIC BLOOD PRESSURE: 74 MMHG | SYSTOLIC BLOOD PRESSURE: 134 MMHG | RESPIRATION RATE: 16 BRPM | WEIGHT: 167 LBS | TEMPERATURE: 99.3 F | BODY MASS INDEX: 26.21 KG/M2

## 2018-05-14 DIAGNOSIS — F17.200 TOBACCO USE DISORDER: ICD-10-CM

## 2018-05-14 DIAGNOSIS — Z00.00 ENCOUNTER FOR ROUTINE ADULT HEALTH EXAMINATION WITHOUT ABNORMAL FINDINGS: Primary | ICD-10-CM

## 2018-05-14 DIAGNOSIS — F33.1 MODERATE EPISODE OF RECURRENT MAJOR DEPRESSIVE DISORDER (H): ICD-10-CM

## 2018-05-14 DIAGNOSIS — F10.10 ALCOHOL ABUSE: ICD-10-CM

## 2018-05-14 DIAGNOSIS — F10.24 ALCOHOL DEPENDENCE WITH ALCOHOL-INDUCED MOOD DISORDER (H): ICD-10-CM

## 2018-05-14 DIAGNOSIS — F41.1 GAD (GENERALIZED ANXIETY DISORDER): ICD-10-CM

## 2018-05-14 PROBLEM — F10.20 ALCOHOL DEPENDENCE (H): Status: ACTIVE | Noted: 2018-05-14

## 2018-05-14 LAB
ALBUMIN SERPL-MCNC: 4.1 G/DL (ref 3.4–5)
ALP SERPL-CCNC: 86 U/L (ref 40–150)
ALT SERPL W P-5'-P-CCNC: 27 U/L (ref 0–70)
ANION GAP SERPL CALCULATED.3IONS-SCNC: 7 MMOL/L (ref 3–14)
AST SERPL W P-5'-P-CCNC: 25 U/L (ref 0–45)
BILIRUB SERPL-MCNC: 0.4 MG/DL (ref 0.2–1.3)
BUN SERPL-MCNC: 13 MG/DL (ref 7–30)
CALCIUM SERPL-MCNC: 8.5 MG/DL (ref 8.5–10.1)
CHLORIDE SERPL-SCNC: 104 MMOL/L (ref 94–109)
CHOLEST SERPL-MCNC: 188 MG/DL
CO2 SERPL-SCNC: 29 MMOL/L (ref 20–32)
CREAT SERPL-MCNC: 0.82 MG/DL (ref 0.66–1.25)
GFR SERPL CREATININE-BSD FRML MDRD: >90 ML/MIN/1.7M2
GLUCOSE SERPL-MCNC: 90 MG/DL (ref 70–99)
HDLC SERPL-MCNC: 105 MG/DL
LDLC SERPL CALC-MCNC: 74 MG/DL
NONHDLC SERPL-MCNC: 83 MG/DL
POTASSIUM SERPL-SCNC: 4 MMOL/L (ref 3.4–5.3)
PROT SERPL-MCNC: 7.3 G/DL (ref 6.8–8.8)
SODIUM SERPL-SCNC: 140 MMOL/L (ref 133–144)
TRIGL SERPL-MCNC: 47 MG/DL

## 2018-05-14 PROCEDURE — 36415 COLL VENOUS BLD VENIPUNCTURE: CPT | Performed by: NURSE PRACTITIONER

## 2018-05-14 PROCEDURE — 99395 PREV VISIT EST AGE 18-39: CPT | Performed by: NURSE PRACTITIONER

## 2018-05-14 PROCEDURE — 80061 LIPID PANEL: CPT | Performed by: NURSE PRACTITIONER

## 2018-05-14 PROCEDURE — 80053 COMPREHEN METABOLIC PANEL: CPT | Performed by: NURSE PRACTITIONER

## 2018-05-14 RX ORDER — HYDROXYZINE HYDROCHLORIDE 25 MG/1
25-50 TABLET, FILM COATED ORAL EVERY 6 HOURS PRN
Qty: 60 TABLET | Refills: 1 | Status: SHIPPED | OUTPATIENT
Start: 2018-05-14 | End: 2019-10-22

## 2018-05-14 ASSESSMENT — ENCOUNTER SYMPTOMS
SHORTNESS OF BREATH: 0
HEMATOCHEZIA: 0
COUGH: 0
HEARTBURN: 0
HEADACHES: 0
CONSTIPATION: 0
DIZZINESS: 0
EYE PAIN: 0
ARTHRALGIAS: 1
PALPITATIONS: 0
HEMATURIA: 0
CHILLS: 0
DIARRHEA: 0
WEAKNESS: 0
NAUSEA: 0
DYSURIA: 0
MYALGIAS: 1
PARESTHESIAS: 0
JOINT SWELLING: 0
FREQUENCY: 0
ABDOMINAL PAIN: 0

## 2018-05-14 ASSESSMENT — ANXIETY QUESTIONNAIRES
7. FEELING AFRAID AS IF SOMETHING AWFUL MIGHT HAPPEN: MORE THAN HALF THE DAYS
IF YOU CHECKED OFF ANY PROBLEMS ON THIS QUESTIONNAIRE, HOW DIFFICULT HAVE THESE PROBLEMS MADE IT FOR YOU TO DO YOUR WORK, TAKE CARE OF THINGS AT HOME, OR GET ALONG WITH OTHER PEOPLE: VERY DIFFICULT
2. NOT BEING ABLE TO STOP OR CONTROL WORRYING: NEARLY EVERY DAY
5. BEING SO RESTLESS THAT IT IS HARD TO SIT STILL: NEARLY EVERY DAY
6. BECOMING EASILY ANNOYED OR IRRITABLE: NEARLY EVERY DAY
3. WORRYING TOO MUCH ABOUT DIFFERENT THINGS: NEARLY EVERY DAY
GAD7 TOTAL SCORE: 20
1. FEELING NERVOUS, ANXIOUS, OR ON EDGE: NEARLY EVERY DAY

## 2018-05-14 ASSESSMENT — PATIENT HEALTH QUESTIONNAIRE - PHQ9
5. POOR APPETITE OR OVEREATING: NEARLY EVERY DAY
SUM OF ALL RESPONSES TO PHQ QUESTIONS 1-9: 19
SUM OF ALL RESPONSES TO PHQ QUESTIONS 1-9: 19
10. IF YOU CHECKED OFF ANY PROBLEMS, HOW DIFFICULT HAVE THESE PROBLEMS MADE IT FOR YOU TO DO YOUR WORK, TAKE CARE OF THINGS AT HOME, OR GET ALONG WITH OTHER PEOPLE: VERY DIFFICULT

## 2018-05-14 ASSESSMENT — PAIN SCALES - GENERAL: PAINLEVEL: MILD PAIN (2)

## 2018-05-14 NOTE — MR AVS SNAPSHOT
After Visit Summary   5/14/2018    Dionisio Pisano    MRN: [id number]           Patient Information     Date Of Birth          1985        Visit Information        Provider Department      5/14/2018 11:10 AM Priscilla Calloway APRN CNP [facility]        Today's Diagnoses     Encounter for routine adult health examination without abnormal findings    -  1    Alcohol abuse        Alcohol dependence with alcohol-induced mood disorder (H)        Tobacco use disorder        Moderate episode of recurrent major depressive disorder (H)        GARY (generalized anxiety disorder)          Care Instructions    - Will do labs today to check your kidney and liver functions, continue to work on reducing your alcohol intake.   - Will start on sertraline 25 mg daily for 1 week then increase to 50mg daily.   -Side effects discussed and provided in instructions below  - Please note that this medication can take at least one month to go into full effect, please do not be discouraged if you do not see your symptoms improving right away.  - It is important to also utilize other non pharmacological mechanisms to manage your anxiety, this includes regular exercise and healthy diet, support of friends and family, and undergoing individual psychotherapy  - Do not stop medication abruptly, please notify me if you have concerns before stopping as we will need to taper you off this medication  - Adherence is important with this medication.   - Atarax as needed for anxiety/insomnia   - Follow up in 3-4 weeks.     JULIETA Cook CNP                            Preventive Health Recommendations  Male Ages 26 - 39    Yearly exam:             See your health care provider every year in order to  o   Review health changes.   o   Discuss preventive care.    o   Review your medicines if your doctor has prescribed any.    You should be tested each year for STDs (sexually transmitted diseases), if you re at risk.      After age 35, talk to your provider about cholesterol testing. If you are at risk for heart disease, have your cholesterol tested at least every 5 years.     If you are at risk for diabetes, you should have a diabetes test (fasting glucose).  Shots: Get a flu shot each year. Get a tetanus shot every 10 years.     Nutrition:    Eat at least 5 servings of fruits and vegetables daily.     Eat whole-grain bread, whole-wheat pasta and brown rice instead of white grains and rice.     Talk to your provider about Calcium and Vitamin D.     Lifestyle    Exercise for at least 150 minutes a week (30 minutes a day, 5 days a week). This will help you control your weight and prevent disease.     Limit alcohol to one drink per day.     No smoking.     Wear sunscreen to prevent skin cancer.     See your dentist every six months for an exam and cleaning.     Sertraline tablets  Brand Name: Zoloft  What is this medicine?  SERTRALINE (SER tra gunnar) is used to treat depression. It may also be used to treat obsessive compulsive disorder, panic disorder, post-trauma stress, premenstrual dysphoric disorder (PMDD) or social anxiety.  How should I use this medicine?  Take this medicine by mouth with a glass of water. Follow the directions on the prescription label. You can take it with or without food. Take your medicine at regular intervals. Do not take your medicine more often than directed. Do not stop taking this medicine suddenly except upon the advice of your doctor. Stopping this medicine too quickly may cause serious side effects or your condition may worsen.  A special MedGuide will be given to you by the pharmacist with each prescription and refill. Be sure to read this information carefully each time.  Talk to your pediatrician regarding the use of this medicine in children. While this drug may be prescribed for children as young as 7 years for selected conditions, precautions do apply.  What side effects may I notice from  receiving this medicine?  Side effects that you should report to your doctor or health care professional as soon as possible:    allergic reactions like skin rash, itching or hives, swelling of the face, lips, or tongue    anxious    black, tarry stools    changes in vision    confusion    elevated mood, decreased need for sleep, racing thoughts, impulsive behavior    eye pain    fast, irregular heartbeat    feeling faint or lightheaded, falls    feeling agitated, angry, or irritable    hallucination, loss of contact with reality    loss of balance or coordination    loss of memory    painful or prolonged erections    restlessness, pacing, inability to keep still    seizures    stiff muscles    suicidal thoughts or other mood changes    trouble sleeping    unusual bleeding or bruising    unusually weak or tired    vomiting  Side effects that usually do not require medical attention (report to your doctor or health care professional if they continue or are bothersome):    change in appetite or weight    change in sex drive or performance    diarrhea    increased sweating    indigestion, nausea    tremors  What may interact with this medicine?  Do not take this medicine with any of the following medications:    certain medicines for fungal infections like fluconazole, itraconazole, ketoconazole, posaconazole, voriconazole    cisapride    disulfiram    dofetilide    linezolid    MAOIs like Carbex, Eldepryl, Marplan, Nardil, and Parnate    metronidazole    methylene blue (injected into a vein)    pimozide    thioridazine    ziprasidone  This medicine may also interact with the following medications:    alcohol    amphetamines    aspirin and aspirin-like medicines    certain medicines for depression, anxiety, or psychotic disturbances    certain medicines for irregular heart beat like flecainide, propafenone    certain medicines for migraine headaches like almotriptan, eletriptan, frovatriptan, naratriptan, rizatriptan,  sumatriptan, zolmitriptan    certain medicines for sleep    certain medicines for seizures like carbamazepine, valproic acid, phenytoin    certain medicines that treat or prevent blood clots like warfarin, enoxaparin, dalteparin    cimetidine    digoxin    diuretics    fentanyl    furazolidone    isoniazid    lithium    NSAIDs, medicines for pain and inflammation, like ibuprofen or naproxen    other medicines that prolong the QT interval (cause an abnormal heart rhythm)    procarbazine    rasagiline    supplements like Debbie's wort, kava kava, valerian    tolbutamide    tramadol    tryptophan  What if I miss a dose?  If you miss a dose, take it as soon as you can. If it is almost time for your next dose, take only that dose. Do not take double or extra doses.  Where should I keep my medicine?  Keep out of the reach of children.  Store at room temperature between 15 and 30 degrees C (59 and 86 degrees F). Throw away any unused medicine after the expiration date.  What should I tell my health care provider before I take this medicine?  They need to know if you have any of these conditions:    bleeding disorders    bipolar disorder or a family history of bipolar disorder    glaucoma    heart disease    high blood pressure    history of irregular heartbeat    history of low levels of calcium, magnesium, or potassium in the blood    if you often drink alcohol    liver disease    receiving electroconvulsive therapy    seizures    suicidal thoughts, plans, or attempt; a previous suicide attempt by you or a family member    take medicines that treat or prevent blood clots    thyroid disease    an unusual or allergic reaction to sertraline, other medicines, foods, dyes, or preservatives    pregnant or trying to get pregnant    breast-feeding  What should I watch for while using this medicine?  Tell your doctor if your symptoms do not get better or if they get worse. Visit your doctor or health care professional for  regular checks on your progress. Because it may take several weeks to see the full effects of this medicine, it is important to continue your treatment as prescribed by your doctor.  Patients and their families should watch out for new or worsening thoughts of suicide or depression. Also watch out for sudden changes in feelings such as feeling anxious, agitated, panicky, irritable, hostile, aggressive, impulsive, severely restless, overly excited and hyperactive, or not being able to sleep. If this happens, especially at the beginning of treatment or after a change in dose, call your health care professional.  You may get drowsy or dizzy. Do not drive, use machinery, or do anything that needs mental alertness until you know how this medicine affects you. Do not stand or sit up quickly, especially if you are an older patient. This reduces the risk of dizzy or fainting spells. Alcohol may interfere with the effect of this medicine. Avoid alcoholic drinks.  Your mouth may get dry. Chewing sugarless gum or sucking hard candy, and drinking plenty of water may help. Contact your doctor if the problem does not go away or is severe.  NOTE:This sheet is a summary. It may not cover all possible information. If you have questions about this medicine, talk to your doctor, pharmacist, or health care provider. Copyright  2018 [employer]vier                Follow-ups after your visit        Who to contact     If you have questions or need follow up information about today's clinic visit or your schedule please contact [facility] directly at [phone].  Normal or non-critical lab and imaging results will be communicated to you by MyChart, letter or phone within 4 business days after the clinic has received the results. If you do not hear from us within 7 days, please contact the clinic through MyChart or phone. If you have a critical or abnormal lab result, we will notify you by phone as soon as possible.  Submit  "refill requests through [employer] or call your pharmacy and they will forward the refill request to us. Please allow 3 business days for your refill to be completed.          Additional Information About Your Visit        [employer] Information     [employer] lets you send messages to your doctor, view your test results, renew your prescriptions, schedule appointments and more. To sign up, go to [email or web address] . Click on \"Log in\" on the left side of the screen, which will take you to the Welcome page. Then click on \"Sign up Now\" on the right side of the page.     You will be asked to enter the access code listed below, as well as some personal information. Please follow the directions to create your username and password.     Your access code is: [email or web address]  Expires: 2018 11:48 AM     Your access code will  in 90 days. If you need help or a new code, please call your [place] clinic or [phone].        Care EveryWhere ID     This is your Care EveryWhere ID. This could be used by other organizations to access your [place] medical records  [id number]        Your Vitals Were     Pulse Temperature Respirations Height BMI (Body Mass Index)       84 99.3  F (37.4  C) 16 5' 7\" (1.702 m) 26.16 kg/m2        Blood Pressure from Last 3 Encounters:   18 134/74   17 135/79   17 122/58    Weight from Last 3 Encounters:   18 167 lb (75.8 kg)   17 165 lb (74.8 kg)   17 163 lb (73.9 kg)              We Performed the Following     Comprehensive metabolic panel     DEPRESSION ACTION PLAN (DAP)     Lipid Profile (Chol, Trig, HDL, LDL calc)          Today's Medication Changes          These changes are accurate as of 18 11:48 AM.  If you have any questions, ask your nurse or doctor.               Start taking these medicines.        Dose/Directions    hydrOXYzine 25 MG tablet   Commonly known as:  ATARAX   Used for:  Moderate episode of recurrent major depressive disorder " (H), GARY (generalized anxiety disorder)   Started by:  Priscilla Calloway APRN CNP        Dose:  25-50 mg   Take 1-2 tablets (25-50 mg) by mouth every 6 hours as needed for anxiety   Quantity:  60 tablet   Refills:  1       sertraline 50 MG tablet   Commonly known as:  ZOLOFT   Used for:  Moderate episode of recurrent major depressive disorder (H), GARY (generalized anxiety disorder)   Started by:  Priscilla Calloway APRN CNP        Take 1/2 tablet (25 mg) for 1 week, then increase to 1 tablet orally daily   Quantity:  30 tablet   Refills:  1            Where to get your medicines      These medications [place] [facility] [place] - [address]  [address], [facility] [address]     Phone:  [phone]     hydrOXYzine 25 MG tablet    sertraline 50 MG tablet                Primary Care Provider Office Phone # Fax #    Madeline Mckeon JULIETA [place] [phone]       [address]  [facility] [address]        Equal Access to Services     [facility]: Hadii aad ku hadasho Soomaali, waaxda luqadaha, qaybta kaalmada adeegyaraj, elisa del rio . So [facility] [phone].    ATENCIÓN: Si habla español, tiene a mars disposición servicios gratuitos de asistencia lingüística. Llame al [phone].    We comply with applicable federal civil rights laws and [place] laws. We do not discriminate on the basis of race, color, national origin, age, disability, sex, sexual orientation, or gender identity.            Thank you!     Thank you for choosing [facility]  for your care. Our goal is always to provide you with excellent care. Hearing back from our patients is one way we can continue to improve our services. Please take a few minutes to complete the written survey that you may receive in the mail after your visit with us. Thank you!             Your Updated Medication List - Protect others around you: Learn how to safely use,  store and throw away your medicines at www.disposemymeds.org.          This list is accurate as of 5/14/18 11:48 AM.  Always use your most recent med list.                   Brand Name Dispense Instructions for use Diagnosis    hydrOXYzine 25 MG tablet    ATARAX    60 tablet    Take 1-2 tablets (25-50 mg) by mouth every 6 hours as needed for anxiety    Moderate episode of recurrent major depressive disorder (H), GARY (generalized anxiety disorder)       IBUPROFEN PO           sertraline 50 MG tablet    ZOLOFT    30 tablet    Take 1/2 tablet (25 mg) for 1 week, then increase to 1 tablet orally daily    Moderate episode of recurrent major depressive disorder (H), GARY (generalized anxiety disorder)

## 2018-05-15 ASSESSMENT — ANXIETY QUESTIONNAIRES: GAD7 TOTAL SCORE: 20

## 2018-06-25 NOTE — PROGRESS NOTES
"Dionisio Pisano is a 32 year old male who is being evaluated via a telephone visit.      The patient has been notified of following:     \"This telephone visit will be conducted via a call between you and your physician/provider. We have found that certain health care needs can be provided without the need for a physical exam.  This service lets us provide the care you need with a short phone conversation.  If a prescription is necessary we can send it directly to your pharmacy.  If lab work is needed we can place an order for that and you can then stop by our lab to have the test done at a later time.    We will bill your insurance company for this service.  Please check with your medical insurance if this type of visit is covered. You may be responsible for the cost of this type of visit if insurance coverage is denied.  The typical cost is $30 (10min), $59 (11-20min) and $85 (21-30min).  Most often these visits are shorter than 10 minutes.    If during the course of the call the physician/provider feels a telephone visit is not appropriate, you will not be charged for this service.\"       Consent has been obtained for this service by care team member: yes.   See the scanned image in the medical record.    Dionisio Pisano complains of  Recheck Medication      I have reviewed and updated the patient's Past Medical History, Social History, Family History and Medication List.    ALLERGIES  No known drug allergies    JBB (MA signature)    Additional provider notes:  (F33.1) Moderate episode of recurrent major depressive disorder (H)  (primary encounter diagnosis)  Comment: Improved and doing well. Having a bit of a stomach ache with the medication. He is taking it on an empty stomach so I did advise to switch to taking it with a snack when he takes it at night.   Plan: sertraline (ZOLOFT) 50 MG tablet        Refill provided, follow up in 3 months for OV.     (F41.1) GARY (generalized anxiety disorder)  Comment: - Stable " using atarax every once and awhile, not frequent. Needs GARY did not get today.   Plan: sertraline (ZOLOFT) 50 MG tablet       Follow up in 3 months for OV.     Will need to re-address alcohol use at OV as we did not discuss this over the phone today.       Assessment/Plan:    PHQ-9 SCORE 5/14/2018 5/14/2018 6/27/2018   Total Score MyChart - 19 (Moderately severe depression) -   Total Score 19 18 4     GARY-7 SCORE 5/14/2018   Total Score 20         I have reviewed the note as documented above.  This accurately captures the substance of my conversation with the patient,      Total time of call between patient and provider was 7 minutes

## 2018-06-27 ENCOUNTER — VIRTUAL VISIT (OUTPATIENT)
Dept: FAMILY MEDICINE | Facility: OTHER | Age: 33
End: 2018-06-27
Payer: COMMERCIAL

## 2018-06-27 DIAGNOSIS — F10.10 ALCOHOL ABUSE: ICD-10-CM

## 2018-06-27 DIAGNOSIS — F33.1 MODERATE EPISODE OF RECURRENT MAJOR DEPRESSIVE DISORDER (H): Primary | ICD-10-CM

## 2018-06-27 DIAGNOSIS — F41.1 GAD (GENERALIZED ANXIETY DISORDER): ICD-10-CM

## 2018-06-27 PROCEDURE — 98966 PH1 ASSMT&MGMT NQHP 5-10: CPT | Performed by: NURSE PRACTITIONER

## 2018-06-27 NOTE — MR AVS SNAPSHOT
"              After Visit Summary   2018    Dionisio Pisano    MRN: [id number]           Patient Information     Date Of Birth          1985        Visit Information        Provider Department      2018 8:20 AM Priscilla Calloway APRN CNP [facility]        Today's Diagnoses     Moderate episode of recurrent major depressive disorder (H)    -  1    GARY (generalized anxiety disorder)        Alcohol abuse           Follow-ups after your visit        Who to contact     If you have questions or need follow up information about today's clinic visit or your schedule please contact [facility] directly at [phone].  Normal or non-critical lab and imaging results will be communicated to you by [employer], letter or phone within 4 business days after the clinic has received the results. If you do not hear from us within 7 days, please contact the clinic through [employer] or phone. If you have a critical or abnormal lab result, we will notify you by phone as soon as possible.  Submit refill requests through [employer] or call your pharmacy and they will forward the refill request to us. Please allow 3 business days for your refill to be completed.          Additional Information About Your Visit        MyChart Information     [employer] lets you send messages to your doctor, view your test results, renew your prescriptions, schedule appointments and more. To sign up, go to [email or web address] . Click on \"Log in\" on the left side of the screen, which will take you to the Welcome page. Then click on \"Sign up Now\" on the right side of the page.     You will be asked to enter the access code listed below, as well as some personal information. Please follow the directions to create your username and password.     Your access code is: [email or web address]  Expires: 2018 11:48 AM     Your access code will  in 90 days. If you need help or a new code, please call your [facility] or " [phone].        Care EveryWhere ID     This is your Care EveryWhere ID. This could be used by other organizations to access your [place] medical records  [id number]         Blood Pressure from Last 3 Encounters:   05/14/18 134/74   05/01/17 135/79   04/14/17 122/58    Weight from Last 3 Encounters:   05/14/18 167 lb (75.8 kg)   05/01/17 165 lb (74.8 kg)   04/17/17 163 lb (73.9 kg)              Today, you had the following     No orders found for display         Today's Medication Changes          These changes are accurate as of 6/27/18  8:48 AM.  If you have any questions, ask your nurse or doctor.               These medicines have changed or have updated prescriptions.        Dose/Directions    sertraline 50 MG tablet   Commonly known as:  ZOLOFT   This may have changed:    - how much to take  - how to take this  - when to take this  - additional instructions   Used for:  Moderate episode of recurrent major depressive disorder (H), GARY (generalized anxiety disorder)        Dose:  50 mg   Take 1 tablet (50 mg) by mouth daily Take 1 tablet (50mg) by mouth daily   Quantity:  90 tablet   Refills:  0            Where to get your medicines      These medications were sent to [id number] - [place], [place] - [address]  [address] [address]    Hours:  [place]ly [place] - numbers unchanged   9/8/03  Phone:  [phone]     sertraline 50 MG tablet                Primary Care Provider Office Phone # Fax #    Madeline Linda Amy Craft, APRN [phone]       [address] [place] [address] [address]  [facility] [address]        Equal Access to Services     [facility] BROOKE AH: Hadii aad ku hadasho Sokal, waaxda luqadaha, qaybta kaalmada elisa johnson. So [facility] [phone].    ATENCIÓN: Si habla español, tiene a mars disposición servicios gratuitos de asistencia lingüística. Josephame al [phone].    We comply with applicable federal civil rights laws and [place] laws.  We do not discriminate on the basis of race, color, national origin, age, disability, sex, sexual orientation, or gender identity.            Thank you!     Thank you for choosing [facility]  for your care. Our goal is always to provide you with excellent care. Hearing back from our patients is one way we can continue to improve our services. Please take a few minutes to complete the written survey that you may receive in the mail after your visit with us. Thank you!             Your Updated Medication List - Protect others around you: Learn how to safely use, store and throw away your medicines at www.disposemymeds.org.          This list is accurate as of 6/27/18  8:48 AM.  Always use your most recent med list.                   Brand Name Dispense Instructions for use Diagnosis    hydrOXYzine 25 MG tablet    ATARAX    60 tablet    Take 1-2 tablets (25-50 mg) by mouth every 6 hours as needed for anxiety    Moderate episode of recurrent major depressive disorder (H), GARY (generalized anxiety disorder)       IBUPROFEN PO           sertraline 50 MG tablet    ZOLOFT    90 tablet    Take 1 tablet (50 mg) by mouth daily Take 1 tablet (50mg) by mouth daily    Moderate episode of recurrent major depressive disorder (H), GARY (generalized anxiety disorder)

## 2018-06-28 ASSESSMENT — PATIENT HEALTH QUESTIONNAIRE - PHQ9: SUM OF ALL RESPONSES TO PHQ QUESTIONS 1-9: 4

## 2018-08-15 ENCOUNTER — OFFICE VISIT (OUTPATIENT)
Dept: FAMILY MEDICINE | Facility: OTHER | Age: 33
End: 2018-08-15
Payer: COMMERCIAL

## 2018-08-15 VITALS
WEIGHT: 167.9 LBS | DIASTOLIC BLOOD PRESSURE: 74 MMHG | BODY MASS INDEX: 26.3 KG/M2 | HEART RATE: 80 BPM | TEMPERATURE: 97.9 F | SYSTOLIC BLOOD PRESSURE: 132 MMHG | RESPIRATION RATE: 14 BRPM

## 2018-08-15 DIAGNOSIS — H10.013 ACUTE FOLLICULAR CONJUNCTIVITIS OF BOTH EYES: Primary | ICD-10-CM

## 2018-08-15 DIAGNOSIS — J30.2 CHRONIC SEASONAL ALLERGIC RHINITIS DUE TO OTHER ALLERGEN: ICD-10-CM

## 2018-08-15 PROCEDURE — 99214 OFFICE O/P EST MOD 30 MIN: CPT | Performed by: FAMILY MEDICINE

## 2018-08-15 RX ORDER — AZELASTINE 1 MG/ML
1 SPRAY, METERED NASAL 2 TIMES DAILY
Qty: 30 ML | Refills: 11 | Status: SHIPPED | OUTPATIENT
Start: 2018-08-15 | End: 2020-06-12

## 2018-08-15 RX ORDER — MONTELUKAST SODIUM 10 MG/1
10 TABLET ORAL AT BEDTIME
Qty: 90 TABLET | Refills: 3 | Status: SHIPPED | OUTPATIENT
Start: 2018-08-15

## 2018-08-15 RX ORDER — TOBRAMYCIN AND DEXAMETHASONE 3; 1 MG/ML; MG/ML
1 SUSPENSION/ DROPS OPHTHALMIC 4 TIMES DAILY
Qty: 2.5 ML | Refills: 0 | Status: SHIPPED | OUTPATIENT
Start: 2018-08-15 | End: 2018-08-22

## 2018-08-15 RX ORDER — LORATADINE 10 MG/1
10 TABLET ORAL DAILY
Qty: 90 TABLET | Refills: 3 | Status: SHIPPED | OUTPATIENT
Start: 2018-08-15

## 2018-08-15 NOTE — MR AVS SNAPSHOT
After Visit Summary   8/15/2018    Dionisio Pisano    MRN: [id number]           Patient Information     Date Of Birth          1985        Visit Information        Provider Department      8/15/2018 9:40 AM Alejandro Kruse MD [facility]        Today's Diagnoses     Acute follicular conjunctivitis of both eyes    -  1    Chronic seasonal allergic rhinitis due to other allergen           Follow-ups after your visit        Who to contact     If you have questions or need follow up information about today's clinic visit or your schedule please contact [facility] directly at [phone].  Normal or non-critical lab and imaging results will be communicated to you by MyChart, letter or phone within 4 business days after the clinic has received the results. If you do not hear from us within 7 days, please contact the clinic through MyChart or phone. If you have a critical or abnormal lab result, we will notify you by phone as soon as possible.  Submit refill requests through [email or web address] or call your pharmacy and they will forward the refill request to us. Please allow 3 business days for your refill to be completed.          Additional Information About Your Visit        Care EveryWhere ID     This is your Care EveryWhere ID. This could be used by other organizations to access your [place] medical records  [id number]        Your Vitals Were     Pulse Temperature Respirations BMI (Body Mass Index)          80 97.9  F (36.6  C) (Temporal) 14 26.3 kg/m2         Blood Pressure from Last 3 Encounters:   08/15/18 132/74   05/14/18 134/74   05/01/17 135/79    Weight from Last 3 Encounters:   08/15/18 167 lb 14.4 oz (76.2 kg)   05/14/18 167 lb (75.8 kg)   05/01/17 165 lb (74.8 kg)              Today, you had the following     No orders found for display         Today's Medication Changes          These changes are accurate as of 8/15/18  8:06 PM.  If you have any questions,  ask your nurse or doctor.               Start taking these medicines.        Dose/Directions    azelastine 0.1 % nasal spray   Commonly known as:  ASTELIN   Used for:  Chronic seasonal allergic rhinitis due to other allergen   Started by:  Alejandro Kruse MD        Dose:  1 spray   Spray 1 spray into both nostrils 2 times daily   Quantity:  30 mL   Refills:  11       loratadine 10 MG tablet   Commonly known as:  CLARITIN   Used for:  Chronic seasonal allergic rhinitis due to other allergen   Started by:  Alejandro Kruse MD        Dose:  10 mg   Take 1 tablet (10 mg) by mouth daily   Quantity:  90 tablet   Refills:  3       montelukast 10 MG tablet   Commonly known as:  SINGULAIR   Used for:  Chronic seasonal allergic rhinitis due to other allergen   Started by:  Alejandro Kruse MD        Dose:  10 mg   Take 1 tablet (10 mg) by mouth At Bedtime   Quantity:  90 tablet   Refills:  3       tobramycin-dexamethasone 0.3-0.1 % ophthalmic susp   Commonly known as:  TOBRADEX   Used for:  Acute follicular conjunctivitis of both eyes   Started by:  Alejandro Kruse MD        Dose:  1 drop   Apply 1 drop to eye 4 times daily for 7 days   Quantity:  2.5 mL   Refills:  0            Where to get your medicines      These medications were sent to [facility] - [address]   [address] [place] [address]     Phone:  [phone]     azelastine 0.1 % nasal spray    loratadine 10 MG tablet    montelukast 10 MG tablet    tobramycin-dexamethasone 0.3-0.1 % ophthalmic susp                Primary Care Provider Office Phone # Fax #    Madeline JULIETA Nolan [phone]       [address]  [facility] [address]        Equal Access to Services     [facility]: Hadjosephine Parks, waaxda luqadaha, qaybta kaalmada adesilvinayada, elisa steiner. So [facility] [phone].    ATENCIÓN: Si isabel jama  disposición servicios gratuitos de asistencia lingüística. Sania waggoner [phone].    We comply with applicable federal civil rights laws and [place] laws. We do not discriminate on the basis of race, color, national origin, age, disability, sex, sexual orientation, or gender identity.            Thank you!     Thank you for choosing [facility]  for your care. Our goal is always to provide you with excellent care. Hearing back from our patients is one way we can continue to improve our services. Please take a few minutes to complete the written survey that you may receive in the mail after your visit with us. Thank you!             Your Updated Medication List - Protect others around you: Learn how to safely use, store and throw away your medicines at www.disposemymeds.org.          This list is accurate as of 8/15/18  8:06 PM.  Always use your most recent med list.                   Brand Name Dispense Instructions for use Diagnosis    azelastine 0.1 % nasal spray    ASTELIN    30 mL    Spray 1 spray into both nostrils 2 times daily    Chronic seasonal allergic rhinitis due to other allergen       hydrOXYzine 25 MG tablet    ATARAX    60 tablet    Take 1-2 tablets (25-50 mg) by mouth every 6 hours as needed for anxiety    Moderate episode of recurrent major depressive disorder (H), GARY (generalized anxiety disorder)       IBUPROFEN PO           loratadine 10 MG tablet    CLARITIN    90 tablet    Take 1 tablet (10 mg) by mouth daily    Chronic seasonal allergic rhinitis due to other allergen       montelukast 10 MG tablet    SINGULAIR    90 tablet    Take 1 tablet (10 mg) by mouth At Bedtime    Chronic seasonal allergic rhinitis due to other allergen       sertraline 50 MG tablet    ZOLOFT    90 tablet    Take 1 tablet (50 mg) by mouth daily Take 1 tablet (50mg) by mouth daily    Moderate episode of recurrent major depressive disorder (H), GARY (generalized anxiety disorder)        tobramycin-dexamethasone 0.3-0.1 % ophthalmic susp    TOBRADEX    2.5 mL    Apply 1 drop to eye 4 times daily for 7 days    Acute follicular conjunctivitis of both eyes

## 2018-08-15 NOTE — PROGRESS NOTES
SUBJECTIVE:   Dionisio Pisano is a 32 year old male who presents to clinic today for the following health issues:      HPI  Acute Illness   Acute illness concerns: Pink eye  Onset: 2 days ago    Fever: no    Chills/Sweats: no    Headache (location?): YES    Sinus Pressure:YES    Conjunctivitis:  YES: bilateral    Ear Pain: YES: right    Rhinorrhea: YES    Congestion: YES    Sore Throat: YES     Cough: YES    Wheeze: no    Decreased Appetite: no    Nausea: no    Vomiting: no    Diarrhea:  no    Dysuria/Freq.: no    Fatigue/Achiness: YES    Sick/Strep Exposure: no     Therapies Tried and outcome: benadryl, ibuprofen, and cough drops    Problem list and histories reviewed & adjusted, as indicated.  Additional history: sinus congestion with runny nose and his eyes were crusted shut the past two day.  He also notes that has been having a lot of allergy symptoms.  Yesterday his nose is running somewhat she felt like he had a fossa going.  He was stuffing Kleenex up his nose to stop this running.  He has had a little throat irritation also which may be from some postnasal drip and cough when he is lying down.  He denies any fevers.  He is a non-smoker.    Patient Active Problem List   Diagnosis     CARDIOVASCULAR SCREENING; LDL GOAL LESS THAN 160     Tobacco use disorder     Dermoid cyst     Alcohol dependence (H)     Alcohol abuse     History reviewed. No pertinent surgical history.    Social History   Substance Use Topics     Smoking status: Former Smoker     Packs/day: 0.50     Types: Dip, chew, snus or snuff     Quit date: 4/4/2009     Smokeless tobacco: Current User     Types: Chew     Alcohol use 24.0 oz/week     40 Cans of beer per week      Comment: few beers every other night     Family History   Problem Relation Age of Onset     Diabetes Father          Allergies   Allergen Reactions     No Known Drug Allergies      Recent Labs   Lab Test  05/14/18   1152   LDL  74   HDL  105   TRIG  47   ALT  27   CR  0.82    GFRESTIMATED  >90   GFRESTBLACK  >90   POTASSIUM  4.0      BP Readings from Last 3 Encounters:   08/15/18 132/74   05/14/18 134/74   05/01/17 135/79    Wt Readings from Last 3 Encounters:   08/15/18 167 lb 14.4 oz (76.2 kg)   05/14/18 167 lb (75.8 kg)   05/01/17 165 lb (74.8 kg)                    ROS:  Constitutional, HEENT, cardiovascular, pulmonary, gi and gu systems are negative, except as otherwise noted.    OBJECTIVE:     /74 (BP Location: Left arm, Patient Position: Chair, Cuff Size: Adult Regular)  Pulse 80  Temp 97.9  F (36.6  C) (Temporal)  Resp 14  Wt 167 lb 14.4 oz (76.2 kg)  BMI 26.3 kg/m2  Body mass index is 26.3 kg/(m^2).  GENERAL: healthy, alert and no distress  EYES: Both sclera are injected and red in the lower lid conjunctiva are markedly injected with papilla and some edema.  HENT: normal cephalic/atraumatic, ear canals and TM's normal, nasal mucosa edematous , rhinorrhea clear and his throat is slightly injected without lesions.   RESP: lungs clear to auscultation - no rales, rhonchi or wheezes  CV: regular rate and rhythm, normal S1 S2, no S3 or S4, no murmur, click or rub, no peripheral edema and peripheral pulses strong    Diagnostic Test Results:  none     ASSESSMENT/PLAN:   (H10.013) Acute follicular conjunctivitis of both eyes  (primary encounter diagnosis)  Comment: Acutely infected eyes most likely bacterial with papilla and some edema secondary to the extreme inflammation.  Plan: tobramycin-dexamethasone (TOBRADEX) 0.3-0.1 %         ophthalmic susp        We will treat with a combination antibiotic and steroid drop to settle the inflammation down and treat the infection.  He is aware that if he is not better within 2 or 3 days of treatment or if the symptoms are worsening or if he has vision changes that he needs to come in for recheck immediately.    (J30.2) Chronic seasonal allergic rhinitis due to other allergen  Comment: He has a lot of seasonal allergies which can be  "exacerbating some of his eye symptoms to  Plan: montelukast (SINGULAIR) 10 MG tablet,         loratadine (CLARITIN) 10 MG tablet, azelastine         (ASTELIN) 0.1 % nasal spray        We will treat with Singulair Claritin and Astelin nasal spray to help control his symptoms.       BP Screening:   Last 3 BP Readings:    BP Readings from Last 3 Encounters:   08/15/18 132/74   05/14/18 134/74   05/01/17 135/79       The following was recommended to the patient:  Re-screen BP within a year and recommended lifestyle modifications  Tobacco Cessation:   reports that he quit smoking about 9 years ago. His smoking use included Dip, chew, snus or snuff. He smoked 0.50 packs per day. His smokeless tobacco use includes Chew.      BMI:   Estimated body mass index is 26.3 kg/(m^2) as calculated from the following:    Height as of 5/14/18: 5' 7\" (1.702 m).    Weight as of this encounter: 167 lb 14.4 oz (76.2 kg).   Weight management plan: not discussed today.    Electronically signed by:  Alejandro Kruse M.D.  8/15/2018    "

## 2018-08-15 NOTE — NURSING NOTE
"Chief Complaint   Patient presents with     Conjunctivitis       Initial /74 (BP Location: Left arm, Patient Position: Chair, Cuff Size: Adult Regular)  Pulse 80  Temp 97.9  F (36.6  C) (Temporal)  Resp 14  Wt 167 lb 14.4 oz (76.2 kg)  BMI 26.3 kg/m2 Estimated body mass index is 26.3 kg/(m^2) as calculated from the following:    Height as of 5/14/18: 5' 7\" (1.702 m).    Weight as of this encounter: 167 lb 14.4 oz (76.2 kg).  BP completed using cuff size: regular    No obstetric history on file.    The following HM Due: NONE      The following patient reported/Care Every where data was sent to:  P ABSTRACT QUALITY INITIATIVES [[id number]]       N/a    Brooklynn Galvin CMA  August 15, 2018           "

## 2018-09-24 ENCOUNTER — TELEPHONE (OUTPATIENT)
Dept: FAMILY MEDICINE | Facility: OTHER | Age: 33
End: 2018-09-24

## 2018-09-24 NOTE — TELEPHONE ENCOUNTER
Summary:    Patient is due/failing the following:   Depression follow up and PHQ9    Action needed:   Patient needs office visit for follow up. and Patient needs to do PHQ9.    Type of outreach:    Sent letter.    Questions for provider review:    None                                                                                                                                    Shahla Villalba       Chart routed to Care Team .        Panel Management Review      Patient has the following on his problem list:     Depression / Dysthymia review    Measure:  Needs PHQ-9 score of 4 or less during index window.  Administer PHQ-9 and if score is 5 or more, send encounter to provider for next steps.      PHQ-9 SCORE 5/14/2018 5/14/2018 6/27/2018   Total Score MyChart - 19 (Moderately severe depression) -   Total Score 19 18 4       If PHQ-9 recheck is 5 or more, route to provider for next steps.    Patient is due for:  PHQ9      Composite cancer screening  Chart review shows that this patient is due/due soon for the following None

## 2018-09-24 NOTE — LETTER
[address]   [facility] [address]  Phone: [phone]  September 24, 2018      Dionisio Pisano  [address] [facility] [address]      Dear Dionisio,    We care about your health and have reviewed your health plan including your medical conditions, medications, and lab results.  Based on this review, it is recommended that you follow up regarding the following health topic(s):  -Depression    We recommend you take the following action(s):  -Complete and return the attached PHQ-9 Form.  If your total score is greater than 9, please schedule a followup appointment.  If you answer Yes to question 9, call your clinic between the hours of 8 to 5.  You may also call the Suicide Hotline at 1-195-987-CXAT (0619) any time.     Please call us at the [facility] - [phone] (or use Robert Applebaum MD) to address the above recommendations.     Thank you for trusting [facility] and we appreciate the opportunity to serve you.  We look forward to supporting your healthcare needs in the future.    Healthy Regards,    Your Health Care Team  [facility]

## 2019-07-08 ENCOUNTER — TELEPHONE (OUTPATIENT)
Dept: FAMILY MEDICINE | Facility: OTHER | Age: 34
End: 2019-07-08

## 2019-07-08 NOTE — TELEPHONE ENCOUNTER
Patient is due for a PHQ-9.  Index start date:3/15/2019  Index end date:7/13/2019    Please call patient.

## 2019-07-10 NOTE — TELEPHONE ENCOUNTER
I have attempted to call the pt to update a PHQ-9. I left message for pt to call back. I will call back another time. Dee Chung CMA ([facility])

## 2019-07-12 NOTE — TELEPHONE ENCOUNTER
Pt didn't return phone calls. PHQ-9 missed. I will close the encounter until further outreach. Dee Chung CMA ([facility])

## 2019-07-24 ENCOUNTER — HOSPITAL ENCOUNTER (EMERGENCY)
Facility: CLINIC | Age: 34
Discharge: HOME | End: 2019-07-24
Attending: FAMILY MEDICINE | Admitting: FAMILY MEDICINE

## 2019-07-24 VITALS
HEART RATE: 74 BPM | WEIGHT: 168 LBS | HEIGHT: 67 IN | SYSTOLIC BLOOD PRESSURE: 115 MMHG | OXYGEN SATURATION: 98 % | RESPIRATION RATE: 18 BRPM | BODY MASS INDEX: 26.37 KG/M2 | TEMPERATURE: 98.2 F | DIASTOLIC BLOOD PRESSURE: 70 MMHG

## 2019-07-24 DIAGNOSIS — L03.115 CELLULITIS OF RIGHT LOWER EXTREMITY: ICD-10-CM

## 2019-07-24 LAB
ANION GAP SERPL CALCULATED.3IONS-SCNC: 5 MMOL/L (ref 3–14)
BASOPHILS # BLD AUTO: 0 10E9/L (ref 0–0.2)
BASOPHILS NFR BLD AUTO: 0.3 %
BUN SERPL-MCNC: 9 MG/DL (ref 7–30)
CALCIUM SERPL-MCNC: 8.5 MG/DL (ref 8.5–10.1)
CHLORIDE SERPL-SCNC: 105 MMOL/L (ref 94–109)
CO2 SERPL-SCNC: 27 MMOL/L (ref 20–32)
CREAT SERPL-MCNC: 0.87 MG/DL (ref 0.66–1.25)
CRP SERPL-MCNC: 67.8 MG/L (ref 0–8)
DIFFERENTIAL METHOD BLD: ABNORMAL
EOSINOPHIL NFR BLD AUTO: 1.3 %
ERYTHROCYTE [DISTWIDTH] IN BLOOD BY AUTOMATED COUNT: 11.4 % (ref 10–15)
GFR SERPL CREATININE-BSD FRML MDRD: >90 ML/MIN/{1.73_M2}
GLUCOSE SERPL-MCNC: 79 MG/DL (ref 70–99)
HCT VFR BLD AUTO: 42.1 % (ref 40–53)
HGB BLD-MCNC: 15.1 G/DL (ref 13.3–17.7)
IMM GRANULOCYTES # BLD: 0 10E9/L (ref 0–0.4)
IMM GRANULOCYTES NFR BLD: 0.3 %
LYMPHOCYTES # BLD AUTO: 1.8 10E9/L (ref 0.8–5.3)
LYMPHOCYTES NFR BLD AUTO: 14.8 %
MCH RBC QN AUTO: 33.3 PG (ref 26.5–33)
MCHC RBC AUTO-ENTMCNC: 35.9 G/DL (ref 31.5–36.5)
MCV RBC AUTO: 93 FL (ref 78–100)
MONOCYTES # BLD AUTO: 1.3 10E9/L (ref 0–1.3)
MONOCYTES NFR BLD AUTO: 11.1 %
NEUTROPHILS # BLD AUTO: 8.6 10E9/L (ref 1.6–8.3)
NEUTROPHILS NFR BLD AUTO: 72.2 %
NRBC # BLD AUTO: 0 10*3/UL
NRBC BLD AUTO-RTO: 0 /100
PLATELET # BLD AUTO: 259 10E9/L (ref 150–450)
POTASSIUM SERPL-SCNC: 3.8 MMOL/L (ref 3.4–5.3)
RBC # BLD AUTO: 4.54 10E12/L (ref 4.4–5.9)
SODIUM SERPL-SCNC: 137 MMOL/L (ref 133–144)
WBC # BLD AUTO: 11.9 10E9/L (ref 4–11)

## 2019-07-24 PROCEDURE — 85025 COMPLETE CBC W/AUTO DIFF WBC: CPT | Performed by: FAMILY MEDICINE

## 2019-07-24 PROCEDURE — 99284 EMERGENCY DEPT VISIT MOD MDM: CPT | Mod: Z6 | Performed by: FAMILY MEDICINE

## 2019-07-24 PROCEDURE — 25000125 ZZHC RX 250: Performed by: FAMILY MEDICINE

## 2019-07-24 PROCEDURE — 86140 C-REACTIVE PROTEIN: CPT | Performed by: FAMILY MEDICINE

## 2019-07-24 PROCEDURE — 96365 THER/PROPH/DIAG IV INF INIT: CPT | Performed by: FAMILY MEDICINE

## 2019-07-24 PROCEDURE — 99284 EMERGENCY DEPT VISIT MOD MDM: CPT | Mod: 25 | Performed by: FAMILY MEDICINE

## 2019-07-24 PROCEDURE — 80048 BASIC METABOLIC PNL TOTAL CA: CPT | Performed by: FAMILY MEDICINE

## 2019-07-24 RX ORDER — CLINDAMYCIN PHOSPHATE 600 MG/50ML
600 INJECTION, SOLUTION INTRAVENOUS EVERY 8 HOURS
Status: DISCONTINUED | OUTPATIENT
Start: 2019-07-24 | End: 2019-07-24 | Stop reason: HOSPADM

## 2019-07-24 RX ORDER — CLINDAMYCIN HCL 300 MG
300 CAPSULE ORAL 4 TIMES DAILY
Qty: 40 CAPSULE | Refills: 0 | Status: SHIPPED | OUTPATIENT
Start: 2019-07-24 | End: 2019-10-22

## 2019-07-24 RX ADMIN — CLINDAMYCIN PHOSPHATE 600 MG: 600 INJECTION, SOLUTION INTRAVENOUS at 12:38

## 2019-07-24 ASSESSMENT — MIFFLIN-ST. JEOR: SCORE: 1660.67

## 2019-07-24 NOTE — ED AVS SNAPSHOT
[facility]  [address] [place] [address]  Phone:  [phone]  Fax:  [phone]                                    Dionisio Pisano   MRN: [id number]    Department:  [facility]   Date of Visit:  7/24/2019           After Visit Summary Signature Page    I have received my discharge instructions, and my questions have been answered. I have discussed any challenges I see with this plan with the nurse or doctor.    ..........................................................................................................................................  Patient/Patient Representative Signature      ..........................................................................................................................................  Patient Representative Print Name and Relationship to Patient    ..................................................               ................................................  Date                                   Time    ..........................................................................................................................................  Reviewed by Signature/Title    ...................................................              ..............................................  Date                                               Time          22EPIC Rev 08/18

## 2019-07-24 NOTE — LETTER
July 24, 2019      To Whom It May Concern:      Dionisio Pisano was seen in our Emergency Department today, 07/24/19.  I expect his condition to improve over the next 2 days.  He may return to work/school when improved.    Sincerely,        Clifton Lopez MD

## 2019-07-24 NOTE — ED PROVIDER NOTES
History     Chief Complaint   Patient presents with     Wound Check     HPI  Dionisio Pisano is a 33 year old male who presents with concerns of possible MRSA and a rash on the right leg.  Patient first noticed this 2 to 3 days ago and is been getting progressively worse.  Patient was seen at the [facility] and started on Bactrim and told to come in if things got worse.  Patient came in today because he feels like the pain is gotten worse and things have not improved.  Patient denies any fevers or chills.  He has been trying ice on the area and this is not helped.  Patient states that he is seen some of these bumps popping up in other places and some of them have been draining.    Allergies:  Allergies   Allergen Reactions     No Known Drug Allergies        Problem List:    Patient Active Problem List    Diagnosis Date Noted     Chronic seasonal allergic rhinitis due to other allergen 08/15/2018     Priority: Medium     Alcohol dependence (H) 05/14/2018     Priority: Medium     Alcohol abuse 05/14/2018     Priority: Medium     Dermoid cyst 05/01/2017     Priority: Medium     CARDIOVASCULAR SCREENING; LDL GOAL LESS THAN 160 04/22/2012     Priority: Medium        Past Medical History:    Past Medical History:   Diagnosis Date     Tobacco use disorder 12/12/2014       Past Surgical History:    No past surgical history on file.    Family History:    Family History   Problem Relation Age of Onset     Diabetes Father        Social History:  Marital Status:  Single [1]  Social History     Tobacco Use     Smoking status: Former Smoker     Packs/day: 0.50     Types: Dip, chew, snus or snuff     Last attempt to quit: 4/4/2009     Years since quitting: 10.3     Smokeless tobacco: Current User     Types: Chew   Substance Use Topics     Alcohol use: Yes     Alcohol/week: 24.0 oz     Types: 40 Cans of beer per week     Comment: few beers every other night     Drug use: Yes     Types: Marijuana        Medications:   "    azelastine (ASTELIN) 0.1 % nasal spray   hydrOXYzine (ATARAX) 25 MG tablet   IBUPROFEN PO   loratadine (CLARITIN) 10 MG tablet   montelukast (SINGULAIR) 10 MG tablet   sertraline (ZOLOFT) 50 MG tablet         Review of Systems   All other systems reviewed and are negative.      Physical Exam   BP: 115/70  Pulse: 74  Temp: 98.2  F (36.8  C)  Resp: 18  Height: 170.2 cm (5' 7\")  Weight: 76.2 kg (168 lb)  SpO2: 98 %      Physical Exam   Constitutional: He is oriented to person, place, and time. He appears well-developed and well-nourished. No distress.   Musculoskeletal: He exhibits no edema.        Legs:  Neurological: He is alert and oriented to person, place, and time.   Skin: Skin is warm and dry. Capillary refill takes less than 2 seconds. Rash noted. He is not diaphoretic.   Nursing note and vitals reviewed.          ED Course        Procedures                 Results for orders placed or performed during the hospital encounter of 07/24/19 (from the past 24 hour(s))   CBC with platelets differential   Result Value Ref Range    WBC 11.9 (H) 4.0 - 11.0 10e9/L    RBC Count 4.54 4.4 - 5.9 10e12/L    Hemoglobin 15.1 13.3 - 17.7 g/dL    Hematocrit 42.1 40.0 - 53.0 %    MCV 93 78 - 100 fl    MCH 33.3 (H) 26.5 - 33.0 pg    MCHC 35.9 31.5 - 36.5 g/dL    RDW 11.4 10.0 - 15.0 %    Platelet Count 259 150 - 450 10e9/L    Diff Method Automated Method     % Neutrophils 72.2 %    % Lymphocytes 14.8 %    % Monocytes 11.1 %    % Eosinophils 1.3 %    % Basophils 0.3 %    % Immature Granulocytes 0.3 %    Nucleated RBCs 0 0 /100    Absolute Neutrophil 8.6 (H) 1.6 - 8.3 10e9/L    Absolute Lymphocytes 1.8 0.8 - 5.3 10e9/L    Absolute Monocytes 1.3 0.0 - 1.3 10e9/L    Absolute Basophils 0.0 0.0 - 0.2 10e9/L    Abs Immature Granulocytes 0.0 0 - 0.4 10e9/L    Absolute Nucleated RBC 0.0    CRP inflammation   Result Value Ref Range    CRP Inflammation 67.8 (H) 0.0 - 8.0 mg/L   Basic metabolic panel   Result Value Ref Range    Sodium 137 " 133 - 144 mmol/L    Potassium 3.8 3.4 - 5.3 mmol/L    Chloride 105 94 - 109 mmol/L    Carbon Dioxide 27 20 - 32 mmol/L    Anion Gap 5 3 - 14 mmol/L    Glucose 79 70 - 99 mg/dL    Urea Nitrogen 9 7 - 30 mg/dL    Creatinine 0.87 0.66 - 1.25 mg/dL    GFR Estimate >90 >60 mL/min/[1.73_m2]    GFR Estimate If Black >90 >60 mL/min/[1.73_m2]    Calcium 8.5 8.5 - 10.1 mg/dL       Medications   clindamycin (CLEOCIN) infusion 600 mg (600 mg Intravenous New Bag 7/24/19 1238)     Patient has some elevation in his infectious markers including a mildly elevated white count and CRP.  Patient is afebrile and otherwise has stable vitals.  I did chase the area of redness noted in the picture above, I think going to change the patient to clindamycin for better MRSA coverage and also better cellulitic coverage.  Patient will follow-up if there is no improvement over the next few days.    Assessments & Plan (with Medical Decision Making)  Cellulitis     I have reviewed the nursing notes.    I have reviewed the findings, diagnosis, plan and need for follow up with the patient.              7/24/2019   [facility] EMERGENCY DEPARTMENT     Clifton Lopez MD  07/24/19 1257

## 2019-07-24 NOTE — ED TRIAGE NOTES
He was diagnosed with MRSA yesterday in his skin wounds on his legs and started antibiotics and was told if the got worse to come in.  The pain is much worse.

## 2019-10-22 ENCOUNTER — OFFICE VISIT (OUTPATIENT)
Dept: FAMILY MEDICINE | Facility: OTHER | Age: 34
End: 2019-10-22
Payer: COMMERCIAL

## 2019-10-22 VITALS
WEIGHT: 180 LBS | RESPIRATION RATE: 16 BRPM | SYSTOLIC BLOOD PRESSURE: 122 MMHG | HEIGHT: 67 IN | DIASTOLIC BLOOD PRESSURE: 68 MMHG | BODY MASS INDEX: 28.25 KG/M2 | HEART RATE: 83 BPM | OXYGEN SATURATION: 99 % | TEMPERATURE: 98.2 F

## 2019-10-22 DIAGNOSIS — M77.01 MEDIAL EPICONDYLITIS OF ELBOW, RIGHT: Primary | ICD-10-CM

## 2019-10-22 DIAGNOSIS — F33.1 MODERATE EPISODE OF RECURRENT MAJOR DEPRESSIVE DISORDER (H): ICD-10-CM

## 2019-10-22 DIAGNOSIS — F41.1 GAD (GENERALIZED ANXIETY DISORDER): ICD-10-CM

## 2019-10-22 PROCEDURE — 99213 OFFICE O/P EST LOW 20 MIN: CPT | Performed by: NURSE PRACTITIONER

## 2019-10-22 RX ORDER — HYDROXYZINE HYDROCHLORIDE 25 MG/1
25-50 TABLET, FILM COATED ORAL EVERY 6 HOURS PRN
Qty: 60 TABLET | Refills: 3 | Status: SHIPPED | OUTPATIENT
Start: 2019-10-22 | End: 2020-06-12

## 2019-10-22 ASSESSMENT — ANXIETY QUESTIONNAIRES
IF YOU CHECKED OFF ANY PROBLEMS ON THIS QUESTIONNAIRE, HOW DIFFICULT HAVE THESE PROBLEMS MADE IT FOR YOU TO DO YOUR WORK, TAKE CARE OF THINGS AT HOME, OR GET ALONG WITH OTHER PEOPLE: SOMEWHAT DIFFICULT
3. WORRYING TOO MUCH ABOUT DIFFERENT THINGS: SEVERAL DAYS
2. NOT BEING ABLE TO STOP OR CONTROL WORRYING: SEVERAL DAYS
1. FEELING NERVOUS, ANXIOUS, OR ON EDGE: SEVERAL DAYS
6. BECOMING EASILY ANNOYED OR IRRITABLE: SEVERAL DAYS
GAD7 TOTAL SCORE: 5
7. FEELING AFRAID AS IF SOMETHING AWFUL MIGHT HAPPEN: NOT AT ALL
5. BEING SO RESTLESS THAT IT IS HARD TO SIT STILL: NOT AT ALL

## 2019-10-22 ASSESSMENT — PATIENT HEALTH QUESTIONNAIRE - PHQ9
SUM OF ALL RESPONSES TO PHQ QUESTIONS 1-9: 3
5. POOR APPETITE OR OVEREATING: SEVERAL DAYS

## 2019-10-22 ASSESSMENT — MIFFLIN-ST. JEOR: SCORE: 1710.1

## 2019-10-22 NOTE — PROGRESS NOTES
Subjective     Dionisio Pisano is a 34 year old male who presents to clinic today for the following health issues:    HPI   Depression and Anxiety Follow-Up    How are you doing with your depression since your last visit? Improved     How are you doing with your anxiety since your last visit?  Improved     Are you having other symptoms that might be associated with depression or anxiety? Yes:  anxiety is occasional     Have you had a significant life event? No     Do you have any concerns with your use of alcohol or other drugs? No     Social History     Tobacco Use     Smoking status: Former Smoker     Packs/day: 0.50     Types: Dip, chew, snus or snuff     Last attempt to quit: 4/4/2009     Years since quitting: 10.5     Smokeless tobacco: Current User     Types: Chew   Substance Use Topics     Alcohol use: Yes     Alcohol/week: 40.0 standard drinks     Types: 40 Cans of beer per week     Comment: few beers every other night     Drug use: Yes     Types: Marijuana     PHQ 5/14/2018 5/14/2018 6/27/2018   PHQ-9 Total Score 19 18 4   Q9: Thoughts of better off dead/self-harm past 2 weeks Not at all Not at all Not at all     GARY-7 SCORE 5/14/2018   Total Score 20     States he does not consume any alcohol he also states he quit smoking marijuana. He states he has history of heavy drinking and this runs in his family he has quit and has changed his lifestyle friends etc..      In the past two weeks have you had thoughts of suicide or self-harm?  No.    Do you have concerns about your personal safety or the safety of others?   No    Suicide Assessment Five-step Evaluation and Treatment (SAFE-T    PROBLEMS TO ADD ON...right epicondylitis. Ongoing flares up about one time per year states has brace at home and wears with flare ups has had injection in past and would like to have this again as it helped he is right handed and is a renee    Patient Active Problem List   Diagnosis     CARDIOVASCULAR SCREENING; LDL GOAL  LESS THAN 160     Dermoid cyst     Alcohol dependence (H)     Alcohol abuse     Chronic seasonal allergic rhinitis due to other allergen     History reviewed. No pertinent surgical history.    Social History     Tobacco Use     Smoking status: Former Smoker     Packs/day: 0.50     Types: Dip, chew, snus or snuff     Last attempt to quit: 4/4/2009     Years since quitting: 10.5     Smokeless tobacco: Current User     Types: Chew   Substance Use Topics     Alcohol use: Yes     Alcohol/week: 40.0 standard drinks     Types: 40 Cans of beer per week     Comment: few beers every other night     Family History   Problem Relation Age of Onset     Diabetes Father          Current Outpatient Medications   Medication Sig Dispense Refill     azelastine (ASTELIN) 0.1 % nasal spray Spray 1 spray into both nostrils 2 times daily 30 mL 11     hydrOXYzine (ATARAX) 25 MG tablet Take 1-2 tablets (25-50 mg) by mouth every 6 hours as needed for anxiety 60 tablet 3     IBUPROFEN PO        loratadine (CLARITIN) 10 MG tablet Take 1 tablet (10 mg) by mouth daily 90 tablet 3     montelukast (SINGULAIR) 10 MG tablet Take 1 tablet (10 mg) by mouth At Bedtime 90 tablet 3     sertraline (ZOLOFT) 50 MG tablet Take 1 tablet (50 mg) by mouth daily Take 1 tablet (50mg) by mouth daily 90 tablet 1     Allergies   Allergen Reactions     No Known Drug Allergies      Recent Labs   Lab Test 07/24/19  1201 05/14/18  1152   LDL  --  74   HDL  --  105   TRIG  --  47   ALT  --  27   CR 0.87 0.82   GFRESTIMATED >90 >90   GFRESTBLACK >90 >90   POTASSIUM 3.8 4.0      BP Readings from Last 3 Encounters:   10/22/19 122/68   07/24/19 115/70   08/15/18 132/74    Wt Readings from Last 3 Encounters:   10/22/19 81.6 kg (180 lb)   07/24/19 76.2 kg (168 lb)   08/15/18 76.2 kg (167 lb 14.4 oz)                    Reviewed and updated as needed this visit by Provider         Review of Systems   ROS COMP: Constitutional, HEENT, cardiovascular, pulmonary, GI, ,  musculoskeletal, neuro, skin, endocrine and psych systems are negative, except as otherwise noted.      Objective    There were no vitals taken for this visit.  There is no height or weight on file to calculate BMI.  Physical Exam   GENERAL: healthy, alert and no distress  NECK: no adenopathy, no asymmetry, masses, or scars and thyroid normal to palpation  RESP: lungs clear to auscultation - no rales, rhonchi or wheezes  CV: regular rate and rhythm, normal S1 S2, no S3 or S4, no murmur, click or rub, no peripheral edema and peripheral pulses strong  MS: right Elbow Exam: Inspection: no swelling, no ecchymosis, no olecranon bursa swelling, no distal bicep tendon defect  Tender: lateral epicondyle  Range of Motion: full  Strength: elbow strength full and full   SKIN: no suspicious lesions or rashes  PSYCH: mentation appears normal, affect normal/bright    Assessment & Plan     1. Moderate episode of recurrent major depressive disorder (H)    - sertraline (ZOLOFT) 50 MG tablet; Take 1 tablet (50 mg) by mouth daily Take 1 tablet (50mg) by mouth daily  Dispense: 90 tablet; Refill: 1  - hydrOXYzine (ATARAX) 25 MG tablet; Take 1-2 tablets (25-50 mg) by mouth every 6 hours as needed for anxiety  Dispense: 60 tablet; Refill: 3    2. GARY (generalized anxiety disorder)    - sertraline (ZOLOFT) 50 MG tablet; Take 1 tablet (50 mg) by mouth daily Take 1 tablet (50mg) by mouth daily  Dispense: 90 tablet; Refill: 1  - hydrOXYzine (ATARAX) 25 MG tablet; Take 1-2 tablets (25-50 mg) by mouth every 6 hours as needed for anxiety  Dispense: 60 tablet; Refill: 3    1-2 refills given he is stable at this dosing will return to clinic in 3-6 mos depending on symptoms.     3. Medial epicondylitis of elbow, right  Scheduled with sports medicine for further evaluation reviewed home care recommendations he is aware this is not a new issue for him.   - ORTHOPEDICS ADULT REFERRAL     BMI:   Estimated body mass index is 28.19 kg/m  as calculated  "from the following:    Height as of this encounter: 1.702 m (5' 7\").    Weight as of this encounter: 81.6 kg (180 lb).   Weight management plan: Patient was referred to their PCP to discuss a diet and exercise plan.        Patient Instructions   Please return to clinic in 6 mos for mood and medication.     Thank you    Madeline Craft [facility]    "

## 2019-10-23 ASSESSMENT — ANXIETY QUESTIONNAIRES: GAD7 TOTAL SCORE: 5

## 2019-11-08 ENCOUNTER — OFFICE VISIT (OUTPATIENT)
Dept: ORTHOPEDICS | Facility: OTHER | Age: 34
End: 2019-11-08
Payer: COMMERCIAL

## 2019-11-08 ENCOUNTER — ANCILLARY PROCEDURE (OUTPATIENT)
Dept: GENERAL RADIOLOGY | Facility: OTHER | Age: 34
End: 2019-11-08
Attending: PHYSICAL MEDICINE & REHABILITATION
Payer: COMMERCIAL

## 2019-11-08 VITALS
SYSTOLIC BLOOD PRESSURE: 102 MMHG | BODY MASS INDEX: 28.25 KG/M2 | HEIGHT: 67 IN | WEIGHT: 180 LBS | DIASTOLIC BLOOD PRESSURE: 62 MMHG

## 2019-11-08 DIAGNOSIS — M25.521 RIGHT ELBOW PAIN: ICD-10-CM

## 2019-11-08 DIAGNOSIS — M77.11 LATERAL EPICONDYLITIS OF RIGHT ELBOW: ICD-10-CM

## 2019-11-08 DIAGNOSIS — M25.521 RIGHT ELBOW PAIN: Primary | ICD-10-CM

## 2019-11-08 PROCEDURE — 99214 OFFICE O/P EST MOD 30 MIN: CPT | Performed by: PHYSICAL MEDICINE & REHABILITATION

## 2019-11-08 PROCEDURE — 73080 X-RAY EXAM OF ELBOW: CPT | Mod: RT

## 2019-11-08 ASSESSMENT — MIFFLIN-ST. JEOR: SCORE: 1710.1

## 2019-11-08 NOTE — PROGRESS NOTES
Sports Medicine Clinic Visit    PCP: No Ref-Primary, Physician    CC: Patient presents with:  Right Elbow - Pain      HPI:  Dionisio Pisano is a 34 year old male who is seen in consultation at the request of Madeline Craft CNP.   He notes right elbow pain that began ~ 5 years ago. He notes the injection completed on 4/17/17 provided nearly full relief for ~ 1 year. He continues to have pain over the lateral elbow. He feels like the pain is worse than before getting the last injection.  He rates the pain at a  8/10 at its worst and a 0/10 currently.  . Symptoms are worsened by lifting, gripping, grasping. He endorses weakness.   He denies swelling, bruising, popping, grinding, numbness and tingling.  Other treatment has included Tylenol and ibuprofen. He notes difficulty with picking up a hammer at time.       Review of Systems:  Musculoskeletal: as above  Remainder of review of systems is negative including constitutional, eyes, ENT, CV, pulmonary, GI, , endocrine, skin, hematologic, and neurologic except as noted in HPI or medical history.    History reviewed. No pertinent past surgical/medical/family/social history other than as mentioned in HPI.    Patient Active Problem List   Diagnosis     CARDIOVASCULAR SCREENING; LDL GOAL LESS THAN 160     Dermoid cyst     Alcohol dependence (H)     Alcohol abuse     Chronic seasonal allergic rhinitis due to other allergen     Past Medical History:   Diagnosis Date     Tobacco use disorder 12/12/2014     No past surgical history on file.  Family History   Problem Relation Age of Onset     Diabetes Father      Social History     Socioeconomic History     Marital status: Single     Spouse name: Not on file     Number of children: Not on file     Years of education: Not on file     Highest education level: Not on file   Occupational History     Not on file   Social Needs     Financial resource strain: Not on file     Food insecurity:     Worry: Not on file     Inability: Not  "on file     Transportation needs:     Medical: Not on file     Non-medical: Not on file   Tobacco Use     Smoking status: Former Smoker     Packs/day: 0.50     Types: Dip, chew, snus or snuff     Last attempt to quit: 4/4/2009     Years since quitting: 10.6     Smokeless tobacco: Current User     Types: Chew   Substance and Sexual Activity     Alcohol use: Yes     Alcohol/week: 40.0 standard drinks     Types: 40 Cans of beer per week     Comment: few beers every other night     Drug use: Yes     Types: Marijuana     Sexual activity: Yes     Partners: Female     Birth control/protection: Condom   Lifestyle     Physical activity:     Days per week: Not on file     Minutes per session: Not on file     Stress: Not on file   Relationships     Social connections:     Talks on phone: Not on file     Gets together: Not on file     Attends [employer] service: Not on file     Active member of club or organization: Not on file     Attends meetings of clubs or organizations: Not on file     Relationship status: Not on file     Intimate partner violence:     Fear of current or ex partner: Not on file     Emotionally abused: Not on file     Physically abused: Not on file     Forced sexual activity: Not on file   Other Topics Concern     Parent/sibling w/ CABG, MI or angioplasty before 65F 55M? No   Social History Narrative     Not on file          He works mixing concrete.    Current Outpatient Medications   Medication     sertraline (ZOLOFT) 50 MG tablet     azelastine (ASTELIN) 0.1 % nasal spray     hydrOXYzine (ATARAX) 25 MG tablet     IBUPROFEN PO     loratadine (CLARITIN) 10 MG tablet     montelukast (SINGULAIR) 10 MG tablet     No current facility-administered medications for this visit.      Allergies   Allergen Reactions     No Known Drug Allergies          Objective:  /62   Ht 1.702 m (5' 7\")   Wt 81.6 kg (180 lb)   BMI 28.19 kg/m      General: Alert and in no distress    Head: Normocephalic, atraumatic  Eyes: no " scleral icterus or conjunctival erythema   Oropharynx:  Mucous membranes moist  Skin: no erythema, petechiae, or jaundice  CV: regular rhythm by palpation, 2+ distal pulses  Resp: normal respiratory effort without conversational dyspnea   Psych: normal mood and affect    Gait: Non-antalgic, appropriate coordination and balance   Neuro: Motor strength and sensation as noted below    Musculoskeletal:    Bilateral Elbow exam:    Inspection:     no ecchymosis       no edema or effusion    Tender:     lateral epicondyle right    Non-Tender:      remainder of the elbow bilaterally    ROM:      full with flexion, extension, forearm supination and pronation bilaterally    Strength:   Elbow flexion 5/5 bilaterally  Elbow extension 5/5 bilaterally  Forearm supination 5/5 bilaterally  Forearm pronation 5/5 bilaterally  Wrist extension 5/5 bilaterally  Wrist flexion 5/5 bilaterally   strength 5/5 bilaterally  Finger abduction 5/5 bilaterally    Sensation: Intact to light touch in the bilateral upper limbs      Radiology:  X-rays ordered and independent visualization of images performed and reviewed with Dionisio.    Recent Results (from the past 744 hour(s))   XR Elbow Right G/E 3 Views    Narrative    XR ELBOW RT G/E 3 VW   11/8/2019 4:37 PM     HISTORY:  medial elbow pain; Right elbow pain      Impression    IMPRESSION:  Negative exam.    KOKO MANCERA MD       Assessment:  1. Right elbow pain    2. Lateral epicondylitis of right elbow        Plan:  Discussed the assessment with the patient and developed a plan together:  -Occupational therapy ordered at the [facility].  Please do 5-6 days of exercises per week between formal sessions and the home exercises they provide.  -Ice or heat 15-20 minutes as needed (Avoid sleeping on a heating pad or ice)  -Patient's preferred over the counter medications as directed on packaging as needed for pain or soreness.  Please take ibuprofen with food. Do not  premedicate prior to activity.  -Over the counter lidocaine cream as needed (i.e. Aspercreme or generic equivalent)  -Avoid aggravating activities.  -Counterforce forearm band as needed for comfort and support.      -Also discussed referral for regenerative treatments    -Please follow up or call if symptoms aren't improving.      Merissa De Jesus MD, CAQ Sports Medicine  [facility]

## 2019-11-08 NOTE — LETTER
11/8/2019         RE: Dionisio Pisano  [address] [facility] [address]        Dear Colleague,    Thank you for referring your patient, Dionisio Pisano, to the [facility]. Please see a copy of my visit note below.    Sports Medicine Clinic Visit    PCP: No Ref-Primary, Physician    CC: Patient presents with:  Right Elbow - Pain      HPI:  Dionisio Pisano is a 34 year old male who is seen in consultation at the request of Madeline Craft CNP.   He notes right elbow pain that began ~ 5 years ago. He notes the injection completed on 4/17/17 provided nearly full relief for ~ 1 year. He continues to have pain over the lateral elbow. He feels like the pain is worse than before getting the last injection.  He rates the pain at a  8/10 at its worst and a 0/10 currently.  . Symptoms are worsened by lifting, gripping, grasping. He endorses weakness.   He denies swelling, bruising, popping, grinding, numbness and tingling.  Other treatment has included Tylenol and ibuprofen. He notes difficulty with picking up a hammer at time.       Review of Systems:  Musculoskeletal: as above  Remainder of review of systems is negative including constitutional, eyes, ENT, CV, pulmonary, GI, , endocrine, skin, hematologic, and neurologic except as noted in HPI or medical history.    History reviewed. No pertinent past surgical/medical/family/social history other than as mentioned in HPI.    Patient Active Problem List   Diagnosis     CARDIOVASCULAR SCREENING; LDL GOAL LESS THAN 160     Dermoid cyst     Alcohol dependence (H)     Alcohol abuse     Chronic seasonal allergic rhinitis due to other allergen     Past Medical History:   Diagnosis Date     Tobacco use disorder 12/12/2014     No past surgical history on file.  Family History   Problem Relation Age of Onset     Diabetes Father      Social History     Socioeconomic History     Marital status: Single     Spouse name: Not on file     Number of children: Not on file      Years of education: Not on file     Highest education level: Not on file   Occupational History     Not on file   Social Needs     Financial resource strain: Not on file     Food insecurity:     Worry: Not on file     Inability: Not on file     Transportation needs:     Medical: Not on file     Non-medical: Not on file   Tobacco Use     Smoking status: Former Smoker     Packs/day: 0.50     Types: Dip, chew, snus or snuff     Last attempt to quit: 4/4/2009     Years since quitting: 10.6     Smokeless tobacco: Current User     Types: Chew   Substance and Sexual Activity     Alcohol use: Yes     Alcohol/week: 40.0 standard drinks     Types: 40 Cans of beer per week     Comment: few beers every other night     Drug use: Yes     Types: Marijuana     Sexual activity: Yes     Partners: Female     Birth control/protection: Condom   Lifestyle     Physical activity:     Days per week: Not on file     Minutes per session: Not on file     Stress: Not on file   Relationships     Social connections:     Talks on phone: Not on file     Gets together: Not on file     Attends Zoroastrian service: Not on file     Active member of club or organization: Not on file     Attends meetings of clubs or organizations: Not on file     Relationship status: Not on file     Intimate partner violence:     Fear of current or ex partner: Not on file     Emotionally abused: Not on file     Physically abused: Not on file     Forced sexual activity: Not on file   Other Topics Concern     Parent/sibling w/ CABG, MI or angioplasty before 65F 55M? No   Social History Narrative     Not on file          He works mixing concrete.    Current Outpatient Medications   Medication     sertraline (ZOLOFT) 50 MG tablet     azelastine (ASTELIN) 0.1 % nasal spray     hydrOXYzine (ATARAX) 25 MG tablet     IBUPROFEN PO     loratadine (CLARITIN) 10 MG tablet     montelukast (SINGULAIR) 10 MG tablet     No current facility-administered medications for this visit.   "    Allergies   Allergen Reactions     No Known Drug Allergies          Objective:  /62   Ht 1.702 m (5' 7\")   Wt 81.6 kg (180 lb)   BMI 28.19 kg/m       General: Alert and in no distress    Head: Normocephalic, atraumatic  Eyes: no scleral icterus or conjunctival erythema   Oropharynx:  Mucous membranes moist  Skin: no erythema, petechiae, or jaundice  CV: regular rhythm by palpation, 2+ distal pulses  Resp: normal respiratory effort without conversational dyspnea   Psych: normal mood and affect    Gait: Non-antalgic, appropriate coordination and balance   Neuro: Motor strength and sensation as noted below    Musculoskeletal:    Bilateral Elbow exam:    Inspection:     no ecchymosis       no edema or effusion    Tender:     lateral epicondyle right    Non-Tender:      remainder of the elbow bilaterally    ROM:      full with flexion, extension, forearm supination and pronation bilaterally    Strength:   Elbow flexion 5/5 bilaterally  Elbow extension 5/5 bilaterally  Forearm supination 5/5 bilaterally  Forearm pronation 5/5 bilaterally  Wrist extension 5/5 bilaterally  Wrist flexion 5/5 bilaterally   strength 5/5 bilaterally  Finger abduction 5/5 bilaterally    Sensation: Intact to light touch in the bilateral upper limbs      Radiology:  X-rays ordered and independent visualization of images performed and reviewed with Dionisio.    Recent Results (from the past 744 hour(s))   XR Elbow Right G/E 3 Views    Narrative    XR ELBOW RT G/E 3 VW   11/8/2019 4:37 PM     HISTORY:  medial elbow pain; Right elbow pain      Impression    IMPRESSION:  Negative exam.    KOKO MANCERA MD       Assessment:  1. Right elbow pain    2. Lateral epicondylitis of right elbow        Plan:  Discussed the assessment with the patient and developed a plan together:  -Occupational therapy ordered at the [facility].  Please do 5-6 days of exercises per week between formal sessions and the home exercises they " provide.  -Ice or heat 15-20 minutes as needed (Avoid sleeping on a heating pad or ice)  -Patient's preferred over the counter medications as directed on packaging as needed for pain or soreness.  Please take ibuprofen with food. Do not premedicate prior to activity.  -Over the counter lidocaine cream as needed (i.e. Aspercreme or generic equivalent)  -Avoid aggravating activities.  -Counterforce forearm band as needed for comfort and support.      -Also discussed referral for regenerative treatments    -Please follow up or call if symptoms aren't improving.      Merissa De Jesus MD, [facility]  [facility]    Again, thank you for allowing me to participate in the care of your patient.        Sincerely,        mAna De Jesus MD

## 2019-11-08 NOTE — PATIENT INSTRUCTIONS
-Occupational therapy ordered at the [facility].  Please do 5-6 days of exercises per week between formal sessions and the home exercises they provide.  -Ice or heat 15-20 minutes as needed (Avoid sleeping on a heating pad or ice)  -Patient's preferred over the counter medications as directed on packaging as needed for pain or soreness.  Please take ibuprofen with food. Do not premedicate prior to activity.  -Over the counter lidocaine cream as needed (i.e. Aspercreme or generic equivalent)  -Avoid aggravating activities.    -Also discussed referral for regenerative treatments    -Please follow up or call if symptoms aren't improving.

## 2020-06-09 DIAGNOSIS — F33.1 MODERATE EPISODE OF RECURRENT MAJOR DEPRESSIVE DISORDER (H): ICD-10-CM

## 2020-06-09 DIAGNOSIS — F41.1 GAD (GENERALIZED ANXIETY DISORDER): ICD-10-CM

## 2020-06-09 NOTE — TELEPHONE ENCOUNTER
Patient is due for mood/med check. Please call and schedule for video visit before the patient runs out of the medication. If they do not have enough to make it to their video visit, send back to the RN. Indicate quantity that patient will need to make it to their appointment.    Megan Thompson, RN, BSN

## 2020-06-11 NOTE — PROGRESS NOTES
"Dionisio Pisano is a 34 year old male who is being evaluated via a billable video visit.      The patient has been notified of following:     \"This video visit will be conducted via a call between you and your physician/provider. We have found that certain health care needs can be provided without the need for an in-person physical exam.  This service lets us provide the care you need with a video conversation.  If a prescription is necessary we can send it directly to your pharmacy.  If lab work is needed we can place an order for that and you can then stop by our lab to have the test done at a later time.    Video visits are billed at different rates depending on your insurance coverage.  Please reach out to your insurance provider with any questions.    If during the course of the call the physician/provider feels a video visit is not appropriate, you will not be charged for this service.\"    Patient has given verbal consent for Video visit? Yes    How would you like to obtain your AVS? Mail a copy  Patient would like the video invitation sent by: Text to cell phone: [phone]    Will anyone else be joining your video visit? No    Subjective     Dionisio Pisano is a 34 year old male who presents today via video visit for the following health issues:    HPI  Depression and Anxiety Follow-Up    How are you doing with your depression since your last visit? Improved     How are you doing with your anxiety since your last visit?  Improved     Are you having other symptoms that might be associated with depression or anxiety? No    Have you had a significant life event? No     Do you have any concerns with your use of alcohol or other drugs? No    Social History     Tobacco Use     Smoking status: Former Smoker     Packs/day: 0.50     Types: Dip, chew, snus or snuff     Last attempt to quit: 2009     Years since quittin.1     Smokeless tobacco: Current User     Types: Chew   Substance Use Topics     Alcohol " use: Yes     Alcohol/week: 40.0 standard drinks     Types: 40 Cans of beer per week     Comment: few beers every other night     Drug use: Yes     Types: Marijuana     PHQ 5/14/2018 6/27/2018 10/22/2019   PHQ-9 Total Score 18 4 3   Q9: Thoughts of better off dead/self-harm past 2 weeks Not at all Not at all Not at all     GARY-7 SCORE 5/14/2018 10/22/2019   Total Score 20 5     Last PHQ-9 6/12/2020   1.  Little interest or pleasure in doing things 1   2.  Feeling down, depressed, or hopeless 1   3.  Trouble falling or staying asleep, or sleeping too much 1   4.  Feeling tired or having little energy 0   5.  Poor appetite or overeating 0   6.  Feeling bad about yourself 1   7.  Trouble concentrating 1   8.  Moving slowly or restless 1   Q9: Thoughts of better off dead/self-harm past 2 weeks 0   PHQ-9 Total Score 6   Difficulty at work, home, or with people Not difficult at all     GARY-7  6/12/2020   1. Feeling nervous, anxious, or on edge 2   2. Not being able to stop or control worrying 1   3. Worrying too much about different things 2   4. Trouble relaxing 2   5. Being so restless that it is hard to sit still 2   6. Becoming easily annoyed or irritable 2   7. Feeling afraid, as if something awful might happen 0   GARY-7 Total Score 11   If you checked any problems, how difficult have they made it for you to do your work, take care of things at home, or get along with other people? Somewhat difficult     In the past two weeks have you had thoughts of suicide or self-harm?  No.    Do you have concerns about your personal safety or the safety of others?   No    Suicide Assessment Five-step Evaluation and Treatment (SAFE-T)      How many servings of fruits and vegetables do you eat daily?  2-3    On average, how many sweetened beverages do you drink each day (Examples: soda, juice, sweet tea, etc.  Do NOT count diet or artificially sweetened beverages)?   0    How many days per week do you exercise enough to make your  heart beat faster? 3 or less    How many minutes a day do you exercise enough to make your heart beat faster? 20 - 29    How many days per week do you miss taking your medication? 0    Patient states he has been taking Zoloft  50 mg daily for > 2 years now and is tolerating well without s/e he would like to continue this medication states his mood is stable and he is doing well overall. Denies thoughts of suicide or self harm .     Video Start Time: 1128    Patient Active Problem List   Diagnosis     CARDIOVASCULAR SCREENING; LDL GOAL LESS THAN 160     Dermoid cyst     Alcohol dependence (H)     Alcohol abuse     Chronic seasonal allergic rhinitis due to other allergen     No past surgical history on file.    Social History     Tobacco Use     Smoking status: Former Smoker     Packs/day: 0.50     Types: Dip, chew, snus or snuff     Last attempt to quit: 2009     Years since quittin.1     Smokeless tobacco: Current User     Types: Chew   Substance Use Topics     Alcohol use: Yes     Alcohol/week: 40.0 standard drinks     Types: 40 Cans of beer per week     Comment: few beers every other night     Family History   Problem Relation Age of Onset     Diabetes Father          Current Outpatient Medications   Medication Sig Dispense Refill     sertraline (ZOLOFT) 50 MG tablet Take 1 tablet (50 mg) by mouth daily Take 1 tablet (50mg) by mouth daily 90 tablet 1     IBUPROFEN PO        loratadine (CLARITIN) 10 MG tablet Take 1 tablet (10 mg) by mouth daily (Patient not taking: Reported on 2019) 90 tablet 3     montelukast (SINGULAIR) 10 MG tablet Take 1 tablet (10 mg) by mouth At Bedtime (Patient not taking: Reported on 2019) 90 tablet 3     Allergies   Allergen Reactions     No Known Drug Allergies      Recent Labs   Lab Test 19  1201 18  1152   LDL  --  74   HDL  --  105   TRIG  --  47   ALT  --  27   CR 0.87 0.82   GFRESTIMATED >90 >90   GFRESTBLACK >90 >90   POTASSIUM 3.8 4.0      BP  "Readings from Last 3 Encounters:   11/08/19 102/62   10/22/19 122/68   07/24/19 115/70    Wt Readings from Last 3 Encounters:   11/08/19 81.6 kg (180 lb)   10/22/19 81.6 kg (180 lb)   07/24/19 76.2 kg (168 lb)                    Reviewed and updated as needed this visit by Provider         Review of Systems   Constitutional, HEENT, cardiovascular, pulmonary, GI, , musculoskeletal, neuro, skin, endocrine and psych systems are negative, except as otherwise noted.      Objective    There were no vitals taken for this visit.  Estimated body mass index is 28.19 kg/m  as calculated from the following:    Height as of 11/8/19: 1.702 m (5' 7\").    Weight as of 11/8/19: 81.6 kg (180 lb).  Physical Exam     GENERAL: Healthy, alert and no distress  EYES: Eyes grossly normal to inspection.  No discharge or erythema, or obvious scleral/conjunctival abnormalities.  RESP: No audible wheeze, cough, or visible cyanosis.  No visible retractions or increased work of breathing.    SKIN: Visible skin clear. No significant rash, abnormal pigmentation or lesions.  NEURO: Cranial nerves grossly intact.  Mentation and speech appropriate for age.  PSYCH: Mentation appears normal, affect normal/bright, judgement and insight intact, normal speech and appearance well-groomed.      Diagnostic Test Results:  Labs reviewed in Epic        Assessment & Plan     1. Moderate episode of recurrent major depressive disorder (H)  Refill given he has been stable > 2 years will follow up in 1 year for refills or sooner if indicated. Home care instructions were reviewed with the patient. The risks, benefits and treatment options of prescribed medications or other treatments have been discussed with the patient. The patient verbalized their understanding and should call or follow up if no improvement or if they develop further problems.    - sertraline (ZOLOFT) 50 MG tablet; Take 1 tablet (50 mg) by mouth daily Take 1 tablet (50mg) by mouth daily  Dispense: " 90 tablet; Refill: 3    2. GARY (generalized anxiety disorder)    - sertraline (ZOLOFT) 50 MG tablet; Take 1 tablet (50 mg) by mouth daily Take 1 tablet (50mg) by mouth daily  Dispense: 90 tablet; Refill: 3       Patient Instructions   Follow up 1 year mood. PHQ-9 may be completed via telephone if stable in 6 mos for refills.     Madeline Craft CNP        Video-Visit Details    Type of service:  Video Visit    Video End Time:11:34 AM    Originating Location (pt. Location): Home    Distant Location (provider location):  [facility]     Platform used for Video Visit: [employer]    No follow-ups on file.       JULIETA Mckeon CNP

## 2020-06-11 NOTE — TELEPHONE ENCOUNTER
Left detailed message informing patient.     RN - please un-pend medication so encounter can be closed.

## 2020-06-12 ENCOUNTER — VIRTUAL VISIT (OUTPATIENT)
Dept: FAMILY MEDICINE | Facility: OTHER | Age: 35
End: 2020-06-12
Payer: COMMERCIAL

## 2020-06-12 DIAGNOSIS — F33.1 MODERATE EPISODE OF RECURRENT MAJOR DEPRESSIVE DISORDER (H): ICD-10-CM

## 2020-06-12 DIAGNOSIS — F41.1 GAD (GENERALIZED ANXIETY DISORDER): ICD-10-CM

## 2020-06-12 PROCEDURE — 99213 OFFICE O/P EST LOW 20 MIN: CPT | Mod: 95 | Performed by: NURSE PRACTITIONER

## 2020-06-12 PROCEDURE — 96127 BRIEF EMOTIONAL/BEHAV ASSMT: CPT | Mod: 95 | Performed by: NURSE PRACTITIONER

## 2020-06-12 ASSESSMENT — ANXIETY QUESTIONNAIRES
GAD7 TOTAL SCORE: 11
7. FEELING AFRAID AS IF SOMETHING AWFUL MIGHT HAPPEN: NOT AT ALL
5. BEING SO RESTLESS THAT IT IS HARD TO SIT STILL: MORE THAN HALF THE DAYS
6. BECOMING EASILY ANNOYED OR IRRITABLE: MORE THAN HALF THE DAYS
IF YOU CHECKED OFF ANY PROBLEMS ON THIS QUESTIONNAIRE, HOW DIFFICULT HAVE THESE PROBLEMS MADE IT FOR YOU TO DO YOUR WORK, TAKE CARE OF THINGS AT HOME, OR GET ALONG WITH OTHER PEOPLE: SOMEWHAT DIFFICULT
2. NOT BEING ABLE TO STOP OR CONTROL WORRYING: SEVERAL DAYS
3. WORRYING TOO MUCH ABOUT DIFFERENT THINGS: MORE THAN HALF THE DAYS
1. FEELING NERVOUS, ANXIOUS, OR ON EDGE: MORE THAN HALF THE DAYS

## 2020-06-12 ASSESSMENT — PATIENT HEALTH QUESTIONNAIRE - PHQ9
SUM OF ALL RESPONSES TO PHQ QUESTIONS 1-9: 6
5. POOR APPETITE OR OVEREATING: MORE THAN HALF THE DAYS

## 2020-06-12 NOTE — PATIENT INSTRUCTIONS
Follow up 1 year mood. PHQ-9 may be completed via telephone if stable in 6 mos for refills.     Madeline Craft CNP

## 2020-06-13 ASSESSMENT — ANXIETY QUESTIONNAIRES: GAD7 TOTAL SCORE: 11

## 2022-02-04 DIAGNOSIS — F33.1 MODERATE EPISODE OF RECURRENT MAJOR DEPRESSIVE DISORDER (H): ICD-10-CM

## 2022-02-04 DIAGNOSIS — F41.1 GAD (GENERALIZED ANXIETY DISORDER): ICD-10-CM

## 2022-02-04 NOTE — LETTER
February 7, 2022      Dionisio Pisano  [address]  [facility] [address]            Hi Dionisio,    We just wanted to let you know that we sent in a refill for your sertraline but you are due for a mood follow up and establish care visit before your next refill is due.     Please give us a call at [phone] or use [email or web address] to schedule.     Have a great day.    Your [facility] Team

## 2022-02-07 NOTE — TELEPHONE ENCOUNTER
Pending Prescriptions:                       Disp   Refills    sertraline (ZOLOFT) 50 MG tablet [Pharmac*90 tab*0            Sig: TAKE ONE TABLET BY MOUTH ONCE DAILY    Medication is being filled for 1 time benjamin refill only due to:  Patient is due for est care and mood follow up    Please call and help schedule.  Thank you!

## 2022-02-09 ENCOUNTER — NURSE TRIAGE (OUTPATIENT)
Dept: NURSING | Facility: CLINIC | Age: 37
End: 2022-02-09
Payer: COMMERCIAL

## 2022-02-10 NOTE — TELEPHONE ENCOUNTER
Calling about refilling Zoloft.  Has enough pills to last through 2/17.  He found out that his medications [facility]    Can you please re-send [facility] to [facility].  Writer sent message to [jaziel RENDON CNP.    Scarlett Pollard RN, [facility] Triage Nurse Advisor      Reason for Disposition    [1] Caller requesting a NON-URGENT new prescription or refill AND [2] triager unable to refill per unit policy    Protocols used: MEDICATION QUESTION CALL-A-

## 2022-06-05 DIAGNOSIS — F41.1 GAD (GENERALIZED ANXIETY DISORDER): ICD-10-CM

## 2022-06-05 DIAGNOSIS — F33.1 MODERATE EPISODE OF RECURRENT MAJOR DEPRESSIVE DISORDER (H): ICD-10-CM

## 2022-06-07 NOTE — TELEPHONE ENCOUNTER
Pending Prescriptions:                       Disp   Refills    sertraline (ZOLOFT) 50 MG tablet [Pharmacy*90 tab*0        Sig: TAKE ONE TABLET BY MOUTH ONCE DAILY        Routing refill request to provider for review/approval because:  Nuris given x1 and patient did not follow up, please advise    Len Mendez, YANELIN, RN, PHN  [facility] RN for [place]  June 7, 2022

## 2022-06-16 ENCOUNTER — TELEPHONE (OUTPATIENT)
Dept: FAMILY MEDICINE | Facility: CLINIC | Age: 37
End: 2022-06-16
Payer: COMMERCIAL

## 2022-06-16 ENCOUNTER — VIRTUAL VISIT (OUTPATIENT)
Dept: FAMILY MEDICINE | Facility: OTHER | Age: 37
End: 2022-06-16
Payer: COMMERCIAL

## 2022-06-16 ENCOUNTER — TELEPHONE (OUTPATIENT)
Dept: FAMILY MEDICINE | Facility: CLINIC | Age: 37
End: 2022-06-16

## 2022-06-16 DIAGNOSIS — F41.1 GAD (GENERALIZED ANXIETY DISORDER): ICD-10-CM

## 2022-06-16 DIAGNOSIS — F33.1 MODERATE EPISODE OF RECURRENT MAJOR DEPRESSIVE DISORDER (H): Primary | ICD-10-CM

## 2022-06-16 PROCEDURE — 99214 OFFICE O/P EST MOD 30 MIN: CPT | Mod: TEL | Performed by: PHYSICIAN ASSISTANT

## 2022-06-16 ASSESSMENT — PATIENT HEALTH QUESTIONNAIRE - PHQ9: SUM OF ALL RESPONSES TO PHQ QUESTIONS 1-9: 11

## 2022-06-16 NOTE — TELEPHONE ENCOUNTER
Please call patient to inform him that I need to see him in clinic as he has not been seen in 2 years (2020) nor have I seen him previously. He is currently scheduled for virtual visit this afternoon at 240pm. I have two same days available tomorrow which he can use.     Dinesh Villatoro PA-C on 6/16/2022 at 7:14 AM

## 2022-06-16 NOTE — TELEPHONE ENCOUNTER
Called patient, informed him that he needs to be seen in clinic.  Patient states he is currently incarcerated and is asking for a 90 day supply for his zoloft    It was hard to hear patient on the phone, equipment in the background    Chanel Mejia MA on 6/16/2022 at 9:10 AM

## 2022-06-16 NOTE — COMMUNITY RESOURCES LIST (ENGLISH)
06/16/2022   [facility] - Outpatient Clinics  Ellydagoberto Chavarria  For questions about this resource list or additional care needs, please contact your primary care clinic or care manager.  Phone: [phone]   Email: N/A   Address: [address] [place] [address]   Hours: N/A        Hotlines and Helplines       Hotline - Crisis help  1  [facility] Distance: 4.37 miles      COVID-19 Status: Phone/Virtual   [address] [place] [address]  Language: English  Hours: Mon - Sun Open 24 Hours   Phone: (948) 569-9464 Email: info.Mbitesolomon@[employer] Website: http://www.[employer].org/     2  [facility] - 24-Hour Mental Health Crisis Hotline Distance: 4.7 miles      COVID-19 Status: Phone/Virtual   [address] [place] [address]  Language: English  Hours: Mon - Sun Open 24 Hours   Phone: (400) 600-1899 Email: care@[facility]."[employer] Website: http://[facility].org          Mental Health       Individual counseling  3  [facility], [facility] Distance: 2.3 miles      COVID-19 Status: Regular Operations, COVID-19 Status: Phone/Virtual   [address] [address]  Language: English  Hours: Mon - Fri 8:00 AM - 8:00 PM  Fees: Insurance, Self Pay, Sliding Fee   Phone: (836) 350-4032 Email: info@[employer] Website: https://[facility].[employer]/     4  [facility] Distance: 2.3 miles      COVID-19 Status: Regular Operations, COVID-19 Status: Phone/Virtual   [address] [place] [address]  Language: English  Hours: Mon 8:00 AM - 8:00 PM , Tue - Wed 8:00 AM - 7:00 PM , Thu 8:00 AM - 8:00 PM , Fri 8:00 AM - 4:00 PM  Fees: Insurance, Self Pay, Sliding Fee   Phone: (615) 936-7258 Email: [facility]@[employer] Website: https://[employer]."[employer]/     Mental health support group  5  [facility] Distance: 10.14 miles      COVID-19 Status:  Unable to Verify   [address] [facility] [place] [address]  Language: English  Hours: Tue 6:30 PM - 8:30 PM  Fees: Free   Phone: (761) 867-7188 Email: cecelia.nonprofit@[employer] Website: http://www.[employer]/pages/home     6  [facility] Distance: 23.57 miles      COVID-19 Status: Phone/Virtual   [address] [facility] [place] [address]  Language: English  Hours: Mon - Fri 9:00 AM - 3:00 PM  Fees: Free   Phone: (792) 611-8908 Website: http://www.[facility].org/          Important Numbers & Websites       Emergency Services   911  [facility]   311  Poison Control   (426) 923-7276  Suicide Prevention Lifeline   (786) 326-5020 (TALK)  Child Abuse Hotline   (800) 151-9377 (4-A-Child)  Sexual Assault Hotline   (449) 171-2943 (HOPE)  National Runaway Safeline   (298) 452-6846 (RUNAWAY)  All-Options Talkline   (196) 662-3881  Substance Abuse Referral   (582) 405-2673 (HELP)

## 2022-06-16 NOTE — PATIENT INSTRUCTIONS
Dionisio,    You are overdue for office visit. I scheduled you to be seen at 1140am at the [facility]. We recommend that you arrive 20 minutes early to complete paperwork and checkin.     We also discussed increasing the dose of your sertraline to 75mg (1.5 tabs) to help with your ongoing anxiety. You can use the tablets which were recently sent and we can refill with correct instructions at your upcoming visit.     If you find that you are not able to make it to this visit please let me know and we can try to find a time which you will be able to make it safely.     Dinesh Villatoro PA-C on 6/16/2022 at 2:36 PM

## 2022-06-16 NOTE — PROGRESS NOTES
Dionisio is a 36 year old who is being evaluated via a billable telephone visit.      What phone number would you like to be contacted at? [phone]  How would you like to obtain your AVS? Mail a copy    Assessment & Plan     1. Moderate episode of recurrent major depressive disorder (H)    2. GARY (generalized anxiety disorder)    3. Incarceration      Patient has not been seen in clinic in 3 years. I explained to him that a virtual visit is not sufficient to safely evaluate his health and continue him on his medication. He informs me that he is currently incarcerate and is participating in a work program. He believes that he would be able to come in for an office visit if given advanced notice. I have scheduled him for office visit next Thursday at 1140am. He will update me if this will not work out.     Anxiety is not well controlled on current dose. Feels that depressive symptoms are minimal. No thoughts of SI/HI. Increase to 1.5 tablets (75mg) daily. Re-evaluate at follow up in 1 week.      GONSALO [facility]   Dionisio is a 36 year old, presenting for the following health issues:  Depression      HPI     Depression Followup    How are you doing with your depression since your last visit? No change    Are you having other symptoms that might be associated with depression? No    Have you had a significant life event?  No     Are you feeling anxious or having panic attacks?   Yes:  had a few    Do you have any concerns with your use of alcohol or other drugs? No    Social History     Tobacco Use     Smoking status: Former Smoker     Packs/day: 0.50     Types: Dip, chew, snus or snuff     Quit date: 2009     Years since quittin.2     Smokeless tobacco: Current User     Types: Chew   Vaping Use     Vaping Use: Never used   Substance Use Topics     Alcohol use: Yes     Alcohol/week: 40.0 standard drinks     Types: 40 Cans of beer per week     Comment: few  beers every other night     Drug use: Yes     Types: Marijuana     PHQ 10/22/2019 6/12/2020 6/16/2022   PHQ-9 Total Score 3 6 11   Q9: Thoughts of better off dead/self-harm past 2 weeks Not at all Not at all Not at all     GARY-7 SCORE 5/14/2018 10/22/2019 6/12/2020   Total Score 20 5 11     Last PHQ-9 6/16/2022   1.  Little interest or pleasure in doing things 0   2.  Feeling down, depressed, or hopeless 2   3.  Trouble falling or staying asleep, or sleeping too much 3   4.  Feeling tired or having little energy 3   5.  Poor appetite or overeating 0   6.  Feeling bad about yourself 0   7.  Trouble concentrating 3   8.  Moving slowly or restless 0   Q9: Thoughts of better off dead/self-harm past 2 weeks 0   PHQ-9 Total Score 11   Difficulty at work, home, or with people Somewhat difficult       Suicide Assessment Five-step Evaluation and Treatment (SAFE-T)      How many servings of fruits and vegetables do you eat daily?  2-3    On average, how many sweetened beverages do you drink each day (Examples: soda, juice, sweet tea, etc.  Do NOT count diet or artificially sweetened beverages)?   1    How many days per week do you exercise enough to make your heart beat faster? 3 or less    How many minutes a day do you exercise enough to make your heart beat faster? 30 - 60    How many days per week do you miss taking your medication? 0        Review of Systems   Constitutional, HEENT, cardiovascular, pulmonary, gi and gu systems are negative, except as otherwise noted.      Objective           Vitals:  No vitals were obtained today due to virtual visit.    Physical Exam   healthy, alert and no distress  PSYCH: Alert and oriented times 3; coherent speech, normal   rate and volume, able to articulate logical thoughts, able   to abstract reason, no tangential thoughts, no hallucinations   or delusions  His affect is normal  RESP: No cough, no audible wheezing, able to talk in full sentences  Remainder of exam unable to be  completed due to telephone visits    Phone call duration: 8 minutes    .  ..

## 2022-06-23 ENCOUNTER — OFFICE VISIT (OUTPATIENT)
Dept: FAMILY MEDICINE | Facility: OTHER | Age: 37
End: 2022-06-23
Payer: COMMERCIAL

## 2022-06-23 VITALS
HEART RATE: 84 BPM | DIASTOLIC BLOOD PRESSURE: 68 MMHG | BODY MASS INDEX: 25.74 KG/M2 | TEMPERATURE: 99.3 F | SYSTOLIC BLOOD PRESSURE: 120 MMHG | OXYGEN SATURATION: 98 % | WEIGHT: 164 LBS | HEIGHT: 67 IN

## 2022-06-23 DIAGNOSIS — Z00.00 ENCOUNTER FOR PREVENTIVE CARE: Primary | ICD-10-CM

## 2022-06-23 DIAGNOSIS — M54.50 CHRONIC BILATERAL LOW BACK PAIN, UNSPECIFIED WHETHER SCIATICA PRESENT: ICD-10-CM

## 2022-06-23 DIAGNOSIS — F41.1 GAD (GENERALIZED ANXIETY DISORDER): ICD-10-CM

## 2022-06-23 DIAGNOSIS — M25.512 LEFT SHOULDER PAIN, UNSPECIFIED CHRONICITY: ICD-10-CM

## 2022-06-23 DIAGNOSIS — F33.1 MODERATE EPISODE OF RECURRENT MAJOR DEPRESSIVE DISORDER (H): ICD-10-CM

## 2022-06-23 DIAGNOSIS — G89.29 CHRONIC BILATERAL LOW BACK PAIN, UNSPECIFIED WHETHER SCIATICA PRESENT: ICD-10-CM

## 2022-06-23 LAB
ALBUMIN SERPL-MCNC: 4.3 G/DL (ref 3.4–5)
ALP SERPL-CCNC: 80 U/L (ref 40–150)
ALT SERPL W P-5'-P-CCNC: 38 U/L (ref 0–70)
ANION GAP SERPL CALCULATED.3IONS-SCNC: 3 MMOL/L (ref 3–14)
AST SERPL W P-5'-P-CCNC: 28 U/L (ref 0–45)
BILIRUB SERPL-MCNC: 0.8 MG/DL (ref 0.2–1.3)
BUN SERPL-MCNC: 14 MG/DL (ref 7–30)
CALCIUM SERPL-MCNC: 8.8 MG/DL (ref 8.5–10.1)
CHLORIDE BLD-SCNC: 107 MMOL/L (ref 94–109)
CHOLEST SERPL-MCNC: 181 MG/DL
CO2 SERPL-SCNC: 31 MMOL/L (ref 20–32)
CREAT SERPL-MCNC: 0.96 MG/DL (ref 0.66–1.25)
ERYTHROCYTE [DISTWIDTH] IN BLOOD BY AUTOMATED COUNT: 12.2 % (ref 10–15)
FASTING STATUS PATIENT QL REPORTED: NO
GFR SERPL CREATININE-BSD FRML MDRD: >90 ML/MIN/1.73M2
GLUCOSE BLD-MCNC: 81 MG/DL (ref 70–99)
HCT VFR BLD AUTO: 41.7 % (ref 40–53)
HDLC SERPL-MCNC: 58 MG/DL
HGB BLD-MCNC: 14.5 G/DL (ref 13.3–17.7)
LDLC SERPL CALC-MCNC: 95 MG/DL
MCH RBC QN AUTO: 31.7 PG (ref 26.5–33)
MCHC RBC AUTO-ENTMCNC: 34.8 G/DL (ref 31.5–36.5)
MCV RBC AUTO: 91 FL (ref 78–100)
NONHDLC SERPL-MCNC: 123 MG/DL
PLATELET # BLD AUTO: 289 10E3/UL (ref 150–450)
POTASSIUM BLD-SCNC: 4.4 MMOL/L (ref 3.4–5.3)
PROT SERPL-MCNC: 7.3 G/DL (ref 6.8–8.8)
RBC # BLD AUTO: 4.58 10E6/UL (ref 4.4–5.9)
SODIUM SERPL-SCNC: 141 MMOL/L (ref 133–144)
TRIGL SERPL-MCNC: 140 MG/DL
WBC # BLD AUTO: 7.3 10E3/UL (ref 4–11)

## 2022-06-23 PROCEDURE — 85027 COMPLETE CBC AUTOMATED: CPT | Performed by: PHYSICIAN ASSISTANT

## 2022-06-23 PROCEDURE — 36415 COLL VENOUS BLD VENIPUNCTURE: CPT | Performed by: PHYSICIAN ASSISTANT

## 2022-06-23 PROCEDURE — 99395 PREV VISIT EST AGE 18-39: CPT | Mod: 25 | Performed by: PHYSICIAN ASSISTANT

## 2022-06-23 PROCEDURE — 90471 IMMUNIZATION ADMIN: CPT | Performed by: PHYSICIAN ASSISTANT

## 2022-06-23 PROCEDURE — 80053 COMPREHEN METABOLIC PANEL: CPT | Performed by: PHYSICIAN ASSISTANT

## 2022-06-23 PROCEDURE — 90715 TDAP VACCINE 7 YRS/> IM: CPT | Performed by: PHYSICIAN ASSISTANT

## 2022-06-23 PROCEDURE — 80061 LIPID PANEL: CPT | Performed by: PHYSICIAN ASSISTANT

## 2022-06-23 PROCEDURE — 99214 OFFICE O/P EST MOD 30 MIN: CPT | Mod: 25 | Performed by: PHYSICIAN ASSISTANT

## 2022-06-23 RX ORDER — SERTRALINE HYDROCHLORIDE 25 MG/1
TABLET, FILM COATED ORAL
Qty: 21 TABLET | Refills: 0 | Status: SHIPPED | OUTPATIENT
Start: 2022-06-23 | End: 2022-09-12

## 2022-06-23 RX ORDER — DULOXETIN HYDROCHLORIDE 30 MG/1
30 CAPSULE, DELAYED RELEASE ORAL DAILY
Qty: 30 CAPSULE | Refills: 1 | Status: SHIPPED | OUTPATIENT
Start: 2022-06-23 | End: 2022-09-12

## 2022-06-23 ASSESSMENT — ENCOUNTER SYMPTOMS
SHORTNESS OF BREATH: 0
WEAKNESS: 1
JOINT SWELLING: 1
NERVOUS/ANXIOUS: 1
ABDOMINAL PAIN: 0
FEVER: 0
HEMATURIA: 0
CONSTIPATION: 0
NAUSEA: 0
DIARRHEA: 0
ARTHRALGIAS: 1
HEADACHES: 0
CHILLS: 0
FREQUENCY: 0
DIZZINESS: 0
DYSURIA: 0
COUGH: 0
PARESTHESIAS: 0
MYALGIAS: 1
HEMATOCHEZIA: 0
HEARTBURN: 0
PALPITATIONS: 0

## 2022-06-23 ASSESSMENT — PAIN SCALES - GENERAL: PAINLEVEL: SEVERE PAIN (6)

## 2022-06-23 NOTE — PROGRESS NOTES
SUBJECTIVE:   CC: Dionisio Pisano is an 36 year old male who presents for preventative health visit.       Patient has been advised of split billing requirements and indicates understanding: Yes  Healthy Habits:     Getting at least 3 servings of Calcium per day:  Yes    Bi-annual eye exam:  NO    Dental care twice a year:  Yes    Sleep apnea or symptoms of sleep apnea:  Daytime drowsiness    Diet:  Regular (no restrictions)    Frequency of exercise:  4-5 days/week    Duration of exercise:  15-30 minutes    Taking medications regularly:  Yes    Medication side effects:  Not applicable    PHQ-2 Total Score: 2    Additional concerns today:  No    Patient is a 36 year old male who presents today for annual checkup. He is long overdue for follow up, most recent visit was in June 2020 and was virtual. Patient informs me that he has is participating in a work release program through the [facility] or state. He has a form with him to verify his attendance to the appointment as well as time of departure. He informs me that he has one concern which is ongoing low back pain stemming from a lower back injury 4 years ago. He was lifting heavy object concrete counter top at work when the injury occurred. He did receive workers compensation benefits and had been working with a chiropractor. Not currently working with a chiropractor. Not interested in pain medication today. He also informs me that he has a history of bilateral tennis elbow which flares off/on and his left shoulder has been hurting more with reaching/pulling and overhead movements. He also notices that it is tender to lay on. Family history of type 2 diabetes in father.     Today's PHQ-2 Score:   PHQ-2 ( 1999 [employer]) 6/23/2022   Q1: Little interest or pleasure in doing things 1   Q2: Feeling down, depressed or hopeless 1   PHQ-2 Score 2   Q1: Little interest or pleasure in doing things Several days   Q2: Feeling down, depressed or hopeless Several days   PHQ-2 Score 2        Abuse: Current or Past(Physical, Sexual or Emotional)- No  Do you feel safe in your environment? Yes    Have you ever done Advance Care Planning? (For example, a Health Directive, POLST, or a discussion with a medical provider or your loved ones about your wishes): No, advance care planning information given to patient to review.  Patient declined advance care planning discussion at this time.    Social History     Tobacco Use     Smoking status: Former Smoker     Packs/day: 0.50     Types: Dip, chew, snus or snuff     Quit date: 2009     Years since quittin.2     Smokeless tobacco: Current User     Types: Chew   Substance Use Topics     Alcohol use: Not Currently     Alcohol/week: 40.0 standard drinks     Types: 40 Cans of beer per week     Comment: few beers every other night     If you drink alcohol do you typically have >3 drinks per day or >7 drinks per week? Not applicable    Alcohol Use 2022   Prescreen: >3 drinks/day or >7 drinks/week? Not Applicable   Prescreen: >3 drinks/day or >7 drinks/week? -   AUDIT SCORE  -       Last PSA: No results found for: PSA    Reviewed orders with patient. Reviewed health maintenance and updated orders accordingly - Yes  Lab work is in process    Reviewed and updated as needed this visit by clinical staff   Tobacco  Allergies  Meds      Soc Hx          Reviewed and updated as needed this visit by Provider    Review of Systems   Constitutional: Negative for chills and fever.   HENT: Negative for congestion, ear pain and hearing loss.    Eyes: Negative for visual disturbance.   Respiratory: Negative for cough and shortness of breath.    Cardiovascular: Negative for chest pain and palpitations.   Gastrointestinal: Negative for abdominal pain, constipation, diarrhea, heartburn, hematochezia and nausea.   Genitourinary: Negative for dysuria, frequency, genital sores, hematuria, impotence, penile discharge and urgency.   Musculoskeletal: Positive for  "arthralgias, joint swelling and myalgias.   Skin: Negative for rash.   Neurological: Positive for weakness. Negative for dizziness, headaches and paresthesias.   Psychiatric/Behavioral: The patient is nervous/anxious.      OBJECTIVE:   /68   Pulse 84   Temp 99.3  F (37.4  C) (Temporal)   Ht 1.7 m (5' 6.93\")   Wt 74.4 kg (164 lb)   SpO2 98%   BMI 25.74 kg/m      Physical Exam  GENERAL: healthy, alert and no distress  EYES: Eyes grossly normal to inspection, PERRL and conjunctivae and sclerae normal  HENT: ear canals and TM's normal, nose and mouth without ulcers or lesions  NECK: no adenopathy, no asymmetry, masses, or scars and thyroid normal to palpation  RESP: lungs clear to auscultation - no rales, rhonchi or wheezes  CV: regular rate and rhythm, normal S1 S2, no S3 or S4, no murmur, click or rub, no peripheral edema and peripheral pulses strong  ABDOMEN: soft, nontender, no hepatosplenomegaly, no masses and bowel sounds normal  MS: Normal AROM of neck/shoulders/elbows. Tenderness to palpation along the left shoulder. Negative empty can. Mild pain with garcia and crossover  NEURO: Normal strength and tone, mentation intact and speech normal  PSYCH: mentation appears normal, affect normal/bright    Diagnostic Test Results:  Results for orders placed or performed in visit on 06/23/22 (from the past 24 hour(s))   CBC with platelets   Result Value Ref Range    WBC Count 7.3 4.0 - 11.0 10e3/uL    RBC Count 4.58 4.40 - 5.90 10e6/uL    Hemoglobin 14.5 13.3 - 17.7 g/dL    Hematocrit 41.7 40.0 - 53.0 %    MCV 91 78 - 100 fL    MCH 31.7 26.5 - 33.0 pg    MCHC 34.8 31.5 - 36.5 g/dL    RDW 12.2 10.0 - 15.0 %    Platelet Count 289 150 - 450 10e3/uL       ASSESSMENT/PLAN:       ICD-10-CM    1. Encounter for preventive care  Z00.00 CBC with platelets     Comprehensive metabolic panel (BMP + Alb, Alk Phos, ALT, AST, Total. Bili, TP)     Lipid Profile (Chol, Trig, HDL, LDL calc)     CBC with platelets     " "Comprehensive metabolic panel (BMP + Alb, Alk Phos, ALT, AST, Total. Bili, TP)     Lipid Profile (Chol, Trig, HDL, LDL calc)   2. Moderate episode of recurrent major depressive disorder (H)  F33.1 sertraline (ZOLOFT) 25 MG tablet     DULoxetine (CYMBALTA) 30 MG capsule   3. GARY (generalized anxiety disorder)  F41.1 sertraline (ZOLOFT) 25 MG tablet     DULoxetine (CYMBALTA) 30 MG capsule   4. Chronic bilateral low back pain, unspecified whether sciatica present  M54.50 DULoxetine (CYMBALTA) 30 MG capsule    G89.29    5. Left shoulder pain, unspecified chronicity  M25.512      Patient has been taking sertraline for management of his anxiety. He felt that the medication has been working well, but that his anxiety continues to be poorly controlled. We discussed transitioning to duloxetine to help with back and joint pains. Patient was receptive to this plan. Outlined 2-4 week taper and when to start the duloxetine. Follow up in 6-8 weeks.     Patient has been advised of split billing requirements and indicates understanding: Yes    COUNSELING:   Reviewed preventive health counseling, as reflected in patient instructions       Regular exercise       Healthy diet/nutrition    Estimated body mass index is 25.74 kg/m  as calculated from the following:    Height as of this encounter: 1.7 m (5' 6.93\").    Weight as of this encounter: 74.4 kg (164 lb).    He reports that he quit smoking about 13 years ago. His smoking use included dip, chew, snus or snuff. He smoked 0.50 packs per day. His smokeless tobacco use includes chew.      Counseling Resources:  ATP IV Guidelines  Pooled Cohorts Equation Calculator  FRAX Risk Assessment  ICSI Preventive Guidelines  Dietary Guidelines for Americans, 2010  USDA's MyPlate  ASA Prophylaxis  Lung CA Screening    Dinesh Villatoro PA-C  [employer]  "

## 2022-06-23 NOTE — PATIENT INSTRUCTIONS
Transition from one ssri to another     Sertraline 25mg tablets daily for 1-2 weeks  Sertraline 25mg tablets every other day for 1-2 weeks     Start duloxetine 30mg daily after 1-2 weeks of every other day sertraline   Preventive Health Recommendations  Male Ages 26 - 39    Yearly exam:             See your health care provider every year in order to  o   Review health changes.   o   Discuss preventive care.    o   Review your medicines if your doctor has prescribed any.    You should be tested each year for STDs (sexually transmitted diseases), if you re at risk.     After age 35, talk to your provider about cholesterol testing. If you are at risk for heart disease, have your cholesterol tested at least every 5 years.     If you are at risk for diabetes, you should have a diabetes test (fasting glucose).  Shots: Get a flu shot each year. Get a tetanus shot every 10 years.     Nutrition:    Eat at least 5 servings of fruits and vegetables daily.     Eat whole-grain bread, whole-wheat pasta and brown rice instead of white grains and rice.     Get adequate Calcium and Vitamin D.     Lifestyle    Exercise for at least 150 minutes a week (30 minutes a day, 5 days a week). This will help you control your weight and prevent disease.     Limit alcohol to one drink per day.     No smoking.     Wear sunscreen to prevent skin cancer.     See your dentist every six months for an exam and cleaning.

## 2022-09-12 ENCOUNTER — VIRTUAL VISIT (OUTPATIENT)
Dept: FAMILY MEDICINE | Facility: OTHER | Age: 37
End: 2022-09-12
Payer: COMMERCIAL

## 2022-09-12 DIAGNOSIS — M54.50 CHRONIC BILATERAL LOW BACK PAIN, UNSPECIFIED WHETHER SCIATICA PRESENT: ICD-10-CM

## 2022-09-12 DIAGNOSIS — F41.1 GAD (GENERALIZED ANXIETY DISORDER): ICD-10-CM

## 2022-09-12 DIAGNOSIS — G89.29 CHRONIC BILATERAL LOW BACK PAIN, UNSPECIFIED WHETHER SCIATICA PRESENT: ICD-10-CM

## 2022-09-12 DIAGNOSIS — F33.1 MODERATE EPISODE OF RECURRENT MAJOR DEPRESSIVE DISORDER (H): ICD-10-CM

## 2022-09-12 PROCEDURE — 99214 OFFICE O/P EST MOD 30 MIN: CPT | Mod: GT | Performed by: PHYSICIAN ASSISTANT

## 2022-09-12 RX ORDER — DULOXETIN HYDROCHLORIDE 60 MG/1
60 CAPSULE, DELAYED RELEASE ORAL DAILY
Qty: 30 CAPSULE | Refills: 1 | Status: SHIPPED | OUTPATIENT
Start: 2022-09-12 | End: 2022-09-12

## 2022-09-12 RX ORDER — DULOXETIN HYDROCHLORIDE 60 MG/1
60 CAPSULE, DELAYED RELEASE ORAL DAILY
Qty: 30 CAPSULE | Refills: 1 | Status: SHIPPED | OUTPATIENT
Start: 2022-09-12 | End: 2022-12-07

## 2022-09-12 ASSESSMENT — ANXIETY QUESTIONNAIRES
3. WORRYING TOO MUCH ABOUT DIFFERENT THINGS: SEVERAL DAYS
GAD7 TOTAL SCORE: 10
GAD7 TOTAL SCORE: 10
8. IF YOU CHECKED OFF ANY PROBLEMS, HOW DIFFICULT HAVE THESE MADE IT FOR YOU TO DO YOUR WORK, TAKE CARE OF THINGS AT HOME, OR GET ALONG WITH OTHER PEOPLE?: SOMEWHAT DIFFICULT
7. FEELING AFRAID AS IF SOMETHING AWFUL MIGHT HAPPEN: NOT AT ALL
4. TROUBLE RELAXING: NEARLY EVERY DAY
2. NOT BEING ABLE TO STOP OR CONTROL WORRYING: SEVERAL DAYS
GAD7 TOTAL SCORE: 10
6. BECOMING EASILY ANNOYED OR IRRITABLE: SEVERAL DAYS
5. BEING SO RESTLESS THAT IT IS HARD TO SIT STILL: NEARLY EVERY DAY
1. FEELING NERVOUS, ANXIOUS, OR ON EDGE: SEVERAL DAYS
IF YOU CHECKED OFF ANY PROBLEMS ON THIS QUESTIONNAIRE, HOW DIFFICULT HAVE THESE PROBLEMS MADE IT FOR YOU TO DO YOUR WORK, TAKE CARE OF THINGS AT HOME, OR GET ALONG WITH OTHER PEOPLE: SOMEWHAT DIFFICULT
7. FEELING AFRAID AS IF SOMETHING AWFUL MIGHT HAPPEN: NOT AT ALL

## 2022-09-12 ASSESSMENT — PATIENT HEALTH QUESTIONNAIRE - PHQ9
SUM OF ALL RESPONSES TO PHQ QUESTIONS 1-9: 8
SUM OF ALL RESPONSES TO PHQ QUESTIONS 1-9: 8
10. IF YOU CHECKED OFF ANY PROBLEMS, HOW DIFFICULT HAVE THESE PROBLEMS MADE IT FOR YOU TO DO YOUR WORK, TAKE CARE OF THINGS AT HOME, OR GET ALONG WITH OTHER PEOPLE: SOMEWHAT DIFFICULT

## 2022-09-12 NOTE — PROGRESS NOTES
Dionisio is a 36 year old who is being evaluated via a billable video visit.      How would you like to obtain your AVS? Backchathart  If the video visit is dropped, the invitation should be resent by: demographic  Will anyone else be joining your video visit? No    Assessment & Plan     Moderate episode of recurrent major depressive disorder (H)  GARY (generalized anxiety disorder)  Chronic bilateral low back pain, unspecified whether sciatica present  Patient has been tolerating the medication without adverse effect. He does not feel that it has provided much benefit for his mood, but says that maybe he has noticed some improvement in aches/pains. He was agreeable to an increased dose. I have sent this out to the pharmacy for him. He will update me via [employer] in 2-4 weeks on whether this has had the intended benefit. If not can have the discussion of alternative treatment vs discontinuation.   - DULoxetine (CYMBALTA) 60 MG capsule; Take 1 capsule (60 mg) by mouth daily    Return in about 9 months (around 6/12/2023) for Return for scheduled annual checkup with PCP.    GONSALO [facility]   Dionisio is a 36 year old, presenting for the following health issues:  Recheck Medication      HPI     Patient is a 36 year old male who has been taking duloxetine for 2-3 months. He says that since starting the medication he has noticed minimal difference in symptoms, but denies side effects or adverse effects. He says that his stress level has been high and attributes this to several factors. He has been working as a  and informs me that the company is looking to hire several employees over this fall as they are underhanded. He also learned that his significant other is 14 weeks pregnant and with estimated delivery in March 2023. He is excited and remaining positive. Prior to his recent incarceration he had sold his home on the suspicion of a longer sentence. With the  new addition to his family he is searching for a home in the [facility] or surrounding area.        Review of Systems   Constitutional, HEENT, cardiovascular, pulmonary, gi and gu systems are negative, except as otherwise noted.      Objective           Vitals:  No vitals were obtained today due to virtual visit.    Physical Exam   GENERAL: Healthy, alert and no distress  EYES: Eyes grossly normal to inspection.  No discharge or erythema, or obvious scleral/conjunctival abnormalities.  RESP: No audible wheeze, cough, or visible cyanosis.  No visible retractions or increased work of breathing.    SKIN: Visible skin clear. No significant rash, abnormal pigmentation or lesions.  NEURO: Cranial nerves grossly intact.  Mentation and speech appropriate for age.  PSYCH: Mentation appears normal, affect normal/bright, judgement and insight intact, normal speech and appearance well-groomed.      Video-Visit Details    Video length 15 minutes     Originating Location (pt. Location): Home    Distant Location (provider location):  [facility]     Platform used for Video Visit: Agent[employer]      Answers for HPI/ROS submitted by the patient on 9/12/2022  If you checked off any problems, how difficult have these problems made it for you to do your work, take care of things at home, or get along with other people?: Somewhat difficult  PHQ9 TOTAL SCORE: 8  GARY 7 TOTAL SCORE: 10  Depression/Anxiety: Depression & Anxiety  Status since last visit:: medium  Anxiety since last: : no change  Other associated symptoms of depression:: No  Other associated symotome: : No  Significant life event: : No  Anxious:: No  Current substance use:: No  How many servings of fruits and vegetables do you eat daily?: 2-3  On average, how many sweetened beverages do you drink each day (Examples: soda, juice, sweet tea, etc.  Do NOT count diet or artificially sweetened beverages)?: 1  How many minutes a day do you exercise enough to  make your heart beat faster?: 30 to 60  How many days a week do you exercise enough to make your heart beat faster?: 4  How many days per week do you miss taking your medication?: 0

## 2022-10-09 ENCOUNTER — HEALTH MAINTENANCE LETTER (OUTPATIENT)
Age: 37
End: 2022-10-09

## 2022-12-04 DIAGNOSIS — F41.1 GAD (GENERALIZED ANXIETY DISORDER): ICD-10-CM

## 2022-12-04 DIAGNOSIS — M54.50 CHRONIC BILATERAL LOW BACK PAIN, UNSPECIFIED WHETHER SCIATICA PRESENT: ICD-10-CM

## 2022-12-04 DIAGNOSIS — F33.1 MODERATE EPISODE OF RECURRENT MAJOR DEPRESSIVE DISORDER (H): ICD-10-CM

## 2022-12-04 DIAGNOSIS — G89.29 CHRONIC BILATERAL LOW BACK PAIN, UNSPECIFIED WHETHER SCIATICA PRESENT: ICD-10-CM

## 2022-12-06 NOTE — TELEPHONE ENCOUNTER
"Pending Prescriptions:                       Disp   Refills    DULoxetine (CYMBALTA) 60 MG capsule [Pharm*30 cap*1        Sig: TAKE 1 CAPSULE(60 MG) BY MOUTH DAILY    Routing refill request to provider for review/approval because:  Requested Prescriptions   Pending Prescriptions Disp Refills    DULoxetine (CYMBALTA) 60 MG capsule [Pharmacy Med Name: DULOXETINE DR 60MG CAPSULES] 30 capsule 1     Sig: TAKE 1 CAPSULE(60 MG) BY MOUTH DAILY       Serotonin-Norepinephrine Reuptake Inhibitors  Failed - 12/4/2022  3:58 AM        Failed - PHQ-9 score of less than 5 in past 6 months     Please review last PHQ-9 score.   PHQ-9 score:    PHQ 9/12/2022   PHQ-9 Total Score 8   Q9: Thoughts of better off dead/self-harm past 2 weeks Not at all           Passed - Blood pressure under 140/90 in past 12 months     BP Readings from Last 3 Encounters:   06/23/22 120/68   11/08/19 102/62   10/22/19 122/68                 Passed - Medication is active on med list        Passed - Patient is age 18 or older        Passed - Recent (6 mo) or future (30 days) visit within the authorizing provider's specialty     Patient had office visit in the last 6 months or has a visit in the next 30 days with authorizing provider or within the authorizing provider's specialty.  See \"Patient Info\" tab in inbasket, or \"Choose Columns\" in Meds & Orders section of the refill encounter.               DULoxetine (CYMBALTA) 60 MG capsule 30 capsule 1 9/12/2022  No   Sig - Route: Take 1 capsule (60 mg) by mouth daily - Oral   Sent to pharmacy as: DULoxetine HCl 60 MG Oral Capsule Delayed Release Particles (CYMBALTA)   Class: E-Prescribe   Order: [id number]   E-Prescribing Status: Receipt confirmed by pharmacy (9/12/2022  4:18 PM CDT)   Kelin Ring RN on 12/6/2022 at 1:51 PM        "

## 2022-12-07 RX ORDER — DULOXETIN HYDROCHLORIDE 60 MG/1
CAPSULE, DELAYED RELEASE ORAL
Qty: 30 CAPSULE | Refills: 1 | Status: SHIPPED | OUTPATIENT
Start: 2022-12-07

## 2023-08-19 ENCOUNTER — HEALTH MAINTENANCE LETTER (OUTPATIENT)
Age: 38
End: 2023-08-19

## 2024-10-12 ENCOUNTER — HEALTH MAINTENANCE LETTER (OUTPATIENT)
Age: 39
End: 2024-10-12

## 2024-10-24 ASSESSMENT — PATIENT HEALTH QUESTIONNAIRE - PHQ9: SUM OF ALL RESPONSES TO PHQ QUESTIONS 1-9: 18
